# Patient Record
Sex: FEMALE | Race: WHITE | NOT HISPANIC OR LATINO
[De-identification: names, ages, dates, MRNs, and addresses within clinical notes are randomized per-mention and may not be internally consistent; named-entity substitution may affect disease eponyms.]

---

## 2017-03-27 ENCOUNTER — TRANSCRIPTION ENCOUNTER (OUTPATIENT)
Age: 77
End: 2017-03-27

## 2017-04-05 ENCOUNTER — APPOINTMENT (OUTPATIENT)
Dept: CARDIOLOGY | Facility: CLINIC | Age: 77
End: 2017-04-05

## 2017-06-19 ENCOUNTER — APPOINTMENT (OUTPATIENT)
Dept: CARDIOLOGY | Facility: CLINIC | Age: 77
End: 2017-06-19

## 2017-07-31 ENCOUNTER — TRANSCRIPTION ENCOUNTER (OUTPATIENT)
Age: 77
End: 2017-07-31

## 2017-12-18 ENCOUNTER — TRANSCRIPTION ENCOUNTER (OUTPATIENT)
Age: 77
End: 2017-12-18

## 2018-01-04 ENCOUNTER — OUTPATIENT (OUTPATIENT)
Dept: OUTPATIENT SERVICES | Facility: HOSPITAL | Age: 78
LOS: 1 days | End: 2018-01-04

## 2018-01-04 ENCOUNTER — INPATIENT (INPATIENT)
Facility: HOSPITAL | Age: 78
LOS: 3 days | Discharge: NURSING, REHAB OR LONG-TERM CARE | End: 2018-01-08
Payer: MEDICARE

## 2018-01-04 PROCEDURE — 72125 CT NECK SPINE W/O DYE: CPT | Mod: 26

## 2018-01-04 PROCEDURE — 73523 X-RAY EXAM HIPS BI 5/> VIEWS: CPT | Mod: 26

## 2018-01-04 PROCEDURE — 99223 1ST HOSP IP/OBS HIGH 75: CPT

## 2018-01-04 PROCEDURE — 71045 X-RAY EXAM CHEST 1 VIEW: CPT | Mod: 26

## 2018-01-04 PROCEDURE — 70450 CT HEAD/BRAIN W/O DYE: CPT | Mod: 26

## 2018-01-04 PROCEDURE — 99285 EMERGENCY DEPT VISIT HI MDM: CPT

## 2018-01-05 PROCEDURE — 93306 TTE W/DOPPLER COMPLETE: CPT | Mod: 26

## 2018-01-05 PROCEDURE — 99233 SBSQ HOSP IP/OBS HIGH 50: CPT

## 2018-01-05 PROCEDURE — 73503 X-RAY EXAM HIP UNI 4/> VIEWS: CPT | Mod: 26,LT

## 2018-01-06 PROCEDURE — 99232 SBSQ HOSP IP/OBS MODERATE 35: CPT

## 2018-01-09 ENCOUNTER — OUTPATIENT (OUTPATIENT)
Dept: OUTPATIENT SERVICES | Facility: HOSPITAL | Age: 78
LOS: 1 days | End: 2018-01-09

## 2018-01-17 ENCOUNTER — OUTPATIENT (OUTPATIENT)
Dept: OUTPATIENT SERVICES | Facility: HOSPITAL | Age: 78
LOS: 1 days | End: 2018-01-17

## 2018-01-31 ENCOUNTER — OUTPATIENT (OUTPATIENT)
Dept: OUTPATIENT SERVICES | Facility: HOSPITAL | Age: 78
LOS: 1 days | End: 2018-01-31

## 2018-11-28 ENCOUNTER — RECORD ABSTRACTING (OUTPATIENT)
Age: 78
End: 2018-11-28

## 2018-11-29 ENCOUNTER — RECORD ABSTRACTING (OUTPATIENT)
Age: 78
End: 2018-11-29

## 2018-11-29 DIAGNOSIS — Z87.19 PERSONAL HISTORY OF OTHER DISEASES OF THE DIGESTIVE SYSTEM: ICD-10-CM

## 2018-11-30 ENCOUNTER — RECORD ABSTRACTING (OUTPATIENT)
Age: 78
End: 2018-11-30

## 2018-12-03 ENCOUNTER — APPOINTMENT (OUTPATIENT)
Dept: CARDIOLOGY | Facility: CLINIC | Age: 78
End: 2018-12-03

## 2019-01-11 ENCOUNTER — RECORD ABSTRACTING (OUTPATIENT)
Age: 79
End: 2019-01-11

## 2019-01-14 ENCOUNTER — NON-APPOINTMENT (OUTPATIENT)
Age: 79
End: 2019-01-14

## 2019-01-14 ENCOUNTER — APPOINTMENT (OUTPATIENT)
Dept: CARDIOLOGY | Facility: CLINIC | Age: 79
End: 2019-01-14
Payer: MEDICARE

## 2019-01-14 ENCOUNTER — RECORD ABSTRACTING (OUTPATIENT)
Age: 79
End: 2019-01-14

## 2019-01-14 VITALS
DIASTOLIC BLOOD PRESSURE: 80 MMHG | OXYGEN SATURATION: 97 % | HEART RATE: 80 BPM | WEIGHT: 147 LBS | BODY MASS INDEX: 23.07 KG/M2 | SYSTOLIC BLOOD PRESSURE: 150 MMHG | HEIGHT: 67 IN

## 2019-01-14 DIAGNOSIS — J44.9 CHRONIC OBSTRUCTIVE PULMONARY DISEASE, UNSPECIFIED: ICD-10-CM

## 2019-01-14 DIAGNOSIS — I07.1 RHEUMATIC TRICUSPID INSUFFICIENCY: ICD-10-CM

## 2019-01-14 PROCEDURE — 93000 ELECTROCARDIOGRAM COMPLETE: CPT

## 2019-01-14 PROCEDURE — 99214 OFFICE O/P EST MOD 30 MIN: CPT

## 2019-01-14 RX ORDER — VALSARTAN AND HYDROCHLOROTHIAZIDE 80; 12.5 MG/1; MG/1
80-12.5 TABLET, FILM COATED ORAL DAILY
Refills: 0 | Status: DISCONTINUED | COMMUNITY
End: 2019-01-14

## 2019-01-14 NOTE — REASON FOR VISIT
[Follow-Up - Clinic] : a clinic follow-up of [Carotid Artery Stenosis] : carotid stenosis [Hyperlipidemia] : hyperlipidemia [Hypertension] : hypertension [Mitral Regurgitation] : mitral regurgitation [Peripheral Vascular Disease] : peripheral vascular disease [FreeTextEntry1] : 78-year-old comes in for followup consultation since last seen in 2017.\par Since last seen it appears she had a fall with fairly fracture. She had a hip replacement. She has been intermittently in the hospital. She has not had any significant cardiovascular hospital admission. She did have an echocardiogram in January 2018, which showed a hyperdynamic LV ejection fraction with moderate tricuspid regurgitation and moderate pulmonary hypertension.\par She had carotid Doppler study recently which showed nonobstructive bilateral disease.\par Her labs have shown normal renal function with normal percussion. There is leukocytosis, which is being managed with your office.\par She continued to have COPD related shortness of breath.\par She has no PND, orthopnea.\par She denies any significant claudication pain.\par She does have venous stasis changes in the lower extremity according to her with congestion. But no significant pain.\par She has no palpitations, dizziness, or syncopal episode.\par She denies any significant chest pain.\par She walks with a cane.\par

## 2019-01-14 NOTE — PHYSICAL EXAM
[General Appearance - Well Developed] : well developed [Normal Appearance] : normal appearance [Well Groomed] : well groomed [General Appearance - Well Nourished] : well nourished [No Deformities] : no deformities [General Appearance - In No Acute Distress] : no acute distress [No Oral Pallor] : no oral pallor [Normal Jugular Venous A Waves Present] : normal jugular venous A waves present [Normal Jugular Venous V Waves Present] : normal jugular venous V waves present [No Jugular Venous [place] A Waves] : no jugular venous langford A waves [Respiration, Rhythm And Depth] : normal respiratory rhythm and effort [Exaggerated Use Of Accessory Muscles For Inspiration] : no accessory muscle use [Heart Rate And Rhythm] : heart rate and rhythm were normal [Heart Sounds] : normal S1 and S2 [Edema] : no peripheral edema present [Abdomen Soft] : soft [Nail Clubbing] : no clubbing of the fingernails [Cyanosis, Localized] : no localized cyanosis [] : no rash [No Skin Ulcers] : no skin ulcer [Affect] : the affect was normal [Normal Conjunctiva] : the conjunctiva exhibited no abnormalities [FreeTextEntry1] : mariola zhang

## 2019-01-14 NOTE — HISTORY OF PRESENT ILLNESS
[FreeTextEntry1] : \par #1 COPD/emphysema. No recent acute exacerbation. Does get worse when she does not take her inhaler therapy. Being followed with pulmonologist. Stable exertional shortness of breath. Tries to go for exercise. No hemoptysis\par \par #2 essential hypertension. without any congestive heart failure, and renal insufficiency. Has been taking her medications regularly. She is nonsmoker.\par \par #3 dyslipidemia. Complain of myalgia, bilateral arm cramping with use of atorvastatin at 20 mg. According to her she did fine at 10 mg \par \par #4 bilateral diffuse peripheral arterial disease.  she was seen at [place]  [facility]. Recommended medical management.  Not on higher doses of statin due to being afraid of myalgia. Does walk with improved claudication distance. No change in color of the skin ulceration or weakness. Symptoms of moderate severity. Increases with activity but irregular walking improves the distance. Stable and nonprogressive. Tolerating low dose of aspirin without significant GI bleeding at present.\par \par #5 osteoarthritis. Significantly involving right hip bilateral knee. left knee replacement. left femur fracture.2016\par osteoporosis\par \par #6 mod nonrheumatic valvular regurgitation.  mod PHTN. \par \par #7 mild carotid atherosclerosis.\par

## 2019-01-14 NOTE — REVIEW OF SYSTEMS
[Dyspnea on exertion] : dyspnea during exertion [see HPI] : see HPI [Cough] : cough [Wheezing] : wheezing [Joint Pain] : joint pain [Joint Stiffness] : joint stiffness [Negative] : Endocrine [Shortness Of Breath] : no shortness of breath [Chest  Pressure] : no chest pressure [Chest Pain] : no chest pain [Lower Ext Edema] : no extremity edema [Leg Claudication] : no intermittent leg claudication [Palpitations] : no palpitations [Coughing Up Blood] : no hemoptysis [Muscle Cramps] : no muscle cramps [Limb Weakness (Paresis)] : no limb weakness

## 2019-01-14 NOTE — DISCUSSION/SUMMARY
[FreeTextEntry1] : #1 moderate tricuspid regurgitation. Moderate pulmonary hypertension. Clinically, no signs of right heart failure. Blood pressure well controlled. Echocardiogram will be done to followup on severity of regurgitation, ejection fraction, RV function, and pulmonary hypertension\par #2 peripheral arterial disease. No claudication pain. No significant resting ischemic symptoms. ABIs/PVR study for followup. Continue aspirin.\par #3 hyperlipidemia. Fasting lipid panel. In presence of carotid, as well as peripheral arterial disease with limited functional status if required. Consider maximizing statin therapy. In the past. She did have significant side effects. LDL goal would be less than 70.\par #4 essential hypertension. Very well controlled. Continue present regimen of medications.\par #5 COPD. Management as per your office.\par \par Counseling regarding low saturated fat, salt and carbohydrate intake was reviewed. Active lifestyle and regular. Exercise along with weight management is advised.\par All the above were at length reviewed. Answered all the questions. Thank you very much for this kind referral. Please do not hesitate to give me a call for any question.\par Part of this transcription was done with voice recognition software and phonetically similar errors are common. I apologize for that. Please donot hesitate to call for any questions due to above.\par \par Followup after above tests

## 2019-01-14 NOTE — ASSESSMENT
[FreeTextEntry1] : On January 14, 2018.\par Labs from December 12, 2018 reviewed.\par EKG January 14, 2019. Sinus bradycardia. Sinus arrhythmia. Indication essential hypertension. Interpretation by me.\par \par Prior tests for reference\par Echocardiogram January 5, 2018. Hyperdynamic LV ejection fraction. Moderate tricuspid consideration. Moderate pulmonary hypertension. Carotid Doppler study.\par  Ordered by PMD. Less than 50% atherosclerotic vascular disease.\par Nuclear myocardial perfusion scan. November 2015. Pharmacological. Non-ischemic MELVI/PVR study. Moderate right below knee arterial disease.\par \par \par \par \par

## 2019-02-06 ENCOUNTER — APPOINTMENT (OUTPATIENT)
Dept: CARDIOLOGY | Facility: CLINIC | Age: 79
End: 2019-02-06
Payer: MEDICARE

## 2019-02-06 VITALS
WEIGHT: 150 LBS | SYSTOLIC BLOOD PRESSURE: 190 MMHG | HEART RATE: 54 BPM | BODY MASS INDEX: 23.49 KG/M2 | DIASTOLIC BLOOD PRESSURE: 100 MMHG

## 2019-02-06 PROCEDURE — 99213 OFFICE O/P EST LOW 20 MIN: CPT

## 2019-02-06 PROCEDURE — 93306 TTE W/DOPPLER COMPLETE: CPT

## 2019-02-06 PROCEDURE — 93923 UPR/LXTR ART STDY 3+ LVLS: CPT

## 2019-02-06 NOTE — ASSESSMENT
[FreeTextEntry1] : TAGNELA BYRD is a 78 year old F who presents today Feb 06, 2019 with the above history and the following active issues: \par \par Asymptomatic markedly elevated high blood pressure: History of hypertension. No h/o CVA or recent hospitalizations for high blood pressure. She does report brief episode of blurry vision yesterday which resolved on its own. She missed her medications that day. Today she took losartan/hctz 100/12.5 and BP is 190/100 on exam. There are no focal neurologic complaints or deficits today. She was advised to take another losartan/hctz tablet today and then resume her normal regimen tomorrow. She will keep her appt with PCP tomorrow for close monitoring and will be seeing Dr. Aranda in 5 days to review testing and recheck BP. She was advised to seek emergent medical attention for any recurrent blurry vision, headache, dizziness, or syncope. \par \par Any questions and concerns were addressed and resolved. \par \par Sincerely,\par \par SAMARA Reinoso\par Patients history, testing, and plan reviewed with supervising MD: Dr. Wilberto Hartmann

## 2019-02-06 NOTE — PHYSICAL EXAM
[General Appearance - Well Developed] : well developed [Normal Appearance] : normal appearance [Well Groomed] : well groomed [General Appearance - Well Nourished] : well nourished [No Deformities] : no deformities [General Appearance - In No Acute Distress] : no acute distress [Normal Conjunctiva] : the conjunctiva exhibited no abnormalities [Eyelids - No Xanthelasma] : the eyelids demonstrated no xanthelasmas [No Oral Pallor] : no oral pallor [No Oral Cyanosis] : no oral cyanosis [Respiration, Rhythm And Depth] : normal respiratory rhythm and effort [Exaggerated Use Of Accessory Muscles For Inspiration] : no accessory muscle use [Auscultation Breath Sounds / Voice Sounds] : lungs were clear to auscultation bilaterally [Heart Rate And Rhythm] : heart rate and rhythm were normal [Heart Sounds] : normal S1 and S2 [Edema] : no peripheral edema present [Skin Color & Pigmentation] : normal skin color and pigmentation [] : no rash [Oriented To Time, Place, And Person] : oriented to person, place, and time [Affect] : the affect was normal [Mood] : the mood was normal [No Anxiety] : not feeling anxious [FreeTextEntry1] : abnormal baseline gait, walks with cane

## 2019-02-06 NOTE — HISTORY OF PRESENT ILLNESS
[FreeTextEntry1] : 77 y/o female presents today in clinical followup for elevated blood pressure. \par \par PMH of pelvic fracture and subsequent gait instability, moderate PH, TR, HTN, HLD, carotid atherosclerosis, and COPD. \par \par There is no history of cerebrovascular events and no hospitalizations for high blood pressure. \par \par She came in this morning for testing. Upon examination during her MELVI with PVR her pressure was noted 190/100. She has remained resting for about an hour an pressure is persistent at 190/100. She denies any focal neurologic complaints. Although yesterday she did note a short episode of blurry vision that resolved on its own.\par \par She was recently changed from diovan to losartan/hctz d/t a recall and missed a dose of her medications yesterday. She took losartan/hctz 100/12.5 this morning at approx 09:30. \par \par She denies chest pain, pressure, palpitations, unusual shortness of breath, orthopnea, LE edema, lightheadedness, dizziness, near syncope or syncope.

## 2019-02-08 ENCOUNTER — RECORD ABSTRACTING (OUTPATIENT)
Age: 79
End: 2019-02-08

## 2019-02-11 ENCOUNTER — APPOINTMENT (OUTPATIENT)
Dept: CARDIOLOGY | Facility: CLINIC | Age: 79
End: 2019-02-11
Payer: MEDICARE

## 2019-02-11 VITALS
WEIGHT: 148 LBS | BODY MASS INDEX: 23.23 KG/M2 | HEART RATE: 78 BPM | HEIGHT: 67 IN | OXYGEN SATURATION: 95 % | DIASTOLIC BLOOD PRESSURE: 90 MMHG | SYSTOLIC BLOOD PRESSURE: 150 MMHG

## 2019-02-11 PROCEDURE — 99214 OFFICE O/P EST MOD 30 MIN: CPT

## 2019-02-11 RX ORDER — ATORVASTATIN CALCIUM 20 MG/1
20 TABLET, FILM COATED ORAL DAILY
Qty: 90 | Refills: 3 | Status: ACTIVE | COMMUNITY
Start: 1900-01-01 | End: 1900-01-01

## 2019-02-11 NOTE — REASON FOR VISIT
[Follow-Up - Clinic] : a clinic follow-up of [Hypertension] : hypertension [Medication Management] : Medication management [Peripheral Vascular Disease] : peripheral vascular disease

## 2019-02-11 NOTE — HISTORY OF PRESENT ILLNESS
[FreeTextEntry1] : \par #1 COPD/emphysema. No recent acute exacerbation. Does get worse when she does not take her inhaler therapy. Being followed with pulmonologist. Stable exertional shortness of breath. Tries to go for exercise. No hemoptysis\par \par #2 essential hypertension. without any congestive heart failure, and renal insufficiency. Has been taking her medications regularly. She is nonsmoker.\par \par #3 dyslipidemia. Complain of myalgia, bilateral arm cramping with use of atorvastatin at 20 mg. According to her she did fine at 10 mg \par \par #4 bilateral diffuse peripheral arterial disease.  she was seen at [facility]  [facility]. Recommended medical management.  Not on higher doses of statin due to being afraid of myalgia. Does walk with improved claudication distance. No change in color of the skin ulceration or weakness. Symptoms of moderate severity. Increases with activity but irregular walking improves the distance. Stable and nonprogressive. Tolerating low dose of aspirin without significant GI bleeding at present.\par \par #5 osteoarthritis. Significantly involving right hip bilateral knee. left knee replacement. left femur fracture.2016\par osteoporosis\par \par #6 mod nonrheumatic valvular regurgitation.  mod PHTN. \par \par #7 mild carotid atherosclerosis.\par

## 2019-02-11 NOTE — ASSESSMENT
[FreeTextEntry1] : On January 14, 2018.\par Labs from December 12, 2018 reviewed.\par EKG January 14, 2019. Sinus bradycardia. Sinus arrhythmia. Indication essential hypertension. Interpretation by me.\par \par Prior tests for reference\par Echocardiogram January 5, 2018. Hyperdynamic LV ejection fraction. Moderate tricuspid consideration. Moderate pulmonary hypertension. Carotid Doppler study.\par  Ordered by PMD. Less than 50% atherosclerotic vascular disease.\par Nuclear myocardial perfusion scan. November 2015. Pharmacological. Non-ischemic MELVI/PVR study. Moderate right below knee arterial disease.\par \par Reviewed on February 11, 2019 \par Echocardiogram. February 6, 2019 P. LV ejection fraction 65%. Mild mitral regurgitation. Systolic anterior motion of mitral leaflet. Mild aortic stenosis. Mild pulmonary hypertension.\par MELVI/PVR study. Significant distal disease, right more than left.\par Labs from January 29, 2018 were to be\par \par \par

## 2019-02-11 NOTE — DISCUSSION/SUMMARY
[FreeTextEntry1] : #1 essential hypertension. Amlodipine 5 mg added. Continual rest of the medications. Better control blood pressure is needed. Low salt diet.\par #2 peripheral artery disease. A vascular evaluation recommended. Diffuse disease. In the past medical management was advised. Increasing walking recommended. Continue aspirin.\par #3 hyperlipidemia. Increase statin therapy. As tolerated.\par Still 40 mg or 80 mg dosage.\par #4 mild to moderate non-rheumatic valvular abnormality.  mild pulmonary hypertension. Continue to follow.\par #5 COPD. Stable. Continue to follow.\par \par Counseling regarding low saturated fat, salt and carbohydrate intake was reviewed. Active lifestyle and regular. Exercise along with weight management is advised.\par All the above were at length reviewed. Answered all the questions. Thank you very much for this kind referral. Please do not hesitate to give me a call for any question.\par Part of this transcription was done with voice recognition software and phonetically similar errors are common. I apologize for that. Please donot hesitate to call for any questions due to above.\par \par

## 2019-02-11 NOTE — PHYSICAL EXAM
[General Appearance - Well Developed] : well developed [Normal Appearance] : normal appearance [Well Groomed] : well groomed [General Appearance - Well Nourished] : well nourished [No Deformities] : no deformities [General Appearance - In No Acute Distress] : no acute distress [Normal Conjunctiva] : the conjunctiva exhibited no abnormalities [Eyelids - No Xanthelasma] : the eyelids demonstrated no xanthelasmas [No Oral Pallor] : no oral pallor [No Oral Cyanosis] : no oral cyanosis [Respiration, Rhythm And Depth] : normal respiratory rhythm and effort [Exaggerated Use Of Accessory Muscles For Inspiration] : no accessory muscle use [Auscultation Breath Sounds / Voice Sounds] : lungs were clear to auscultation bilaterally [Heart Rate And Rhythm] : heart rate and rhythm were normal [Heart Sounds] : normal S1 and S2 [Edema] : no peripheral edema present [FreeTextEntry1] : abnormal baseline gait, walks with cane [Skin Color & Pigmentation] : normal skin color and pigmentation [] : no rash [Oriented To Time, Place, And Person] : oriented to person, place, and time [Affect] : the affect was normal [Mood] : the mood was normal [No Anxiety] : not feeling anxious

## 2019-03-20 ENCOUNTER — APPOINTMENT (OUTPATIENT)
Dept: CARDIOLOGY | Facility: CLINIC | Age: 79
End: 2019-03-20
Payer: MEDICARE

## 2019-03-20 VITALS
DIASTOLIC BLOOD PRESSURE: 80 MMHG | BODY MASS INDEX: 23.54 KG/M2 | WEIGHT: 150 LBS | HEART RATE: 73 BPM | SYSTOLIC BLOOD PRESSURE: 142 MMHG | HEIGHT: 67 IN

## 2019-03-20 VITALS — DIASTOLIC BLOOD PRESSURE: 80 MMHG | SYSTOLIC BLOOD PRESSURE: 150 MMHG

## 2019-03-20 DIAGNOSIS — R68.89 OTHER GENERAL SYMPTOMS AND SIGNS: ICD-10-CM

## 2019-03-20 PROCEDURE — 99215 OFFICE O/P EST HI 40 MIN: CPT

## 2019-03-20 NOTE — HISTORY OF PRESENT ILLNESS
[FreeTextEntry1] : 77 y/o with PMH of COPD/emphysema , HTN , dyslipidemia who is referred because of history of peripheral vascular disease on medical management . She has multiple left leg surgery including knee replacement , hip fracture surgery over the past year . She currently walks with a cane and is able to walk in a grocery store without any perceived claudication. MELVI done recently shows Right MELVI 0.47 and left MELVI 0.81. She denies any LE numbness , tingling, ulcer or skin changes.\par In the past she had claudication in the right LE on walking 2 blocks but these symptoms doesn't not occur now. \par

## 2019-03-20 NOTE — ASSESSMENT
[FreeTextEntry1] : Peripheral artery disease moderate to severe in right LE ( Right MELVI 0.47) , there is however discrepancy in severity of ischemia on MELVI and symptoms. \par will obtain U/S bilateral LE to evaluate anatomically , likely moderate to severe outflow disease ( SFA disease) \par Medical therapy for now including ASA 81 mg and statins (LDL goal < 70 mg) , can add cilostazol 50 mg twice daily. \par \par Mild carotid atherosclerosis on ASA 81 mg and statins. no TIA/CVA, follow every 2-3 years.

## 2019-03-20 NOTE — REASON FOR VISIT
[Initial Evaluation] : an initial evaluation of [Peripheral Vascular Disease] : peripheral vascular disease

## 2019-03-20 NOTE — PHYSICAL EXAM
[General Appearance - Well Developed] : well developed [Normal Appearance] : normal appearance [Well Groomed] : well groomed [General Appearance - Well Nourished] : well nourished [Normal Conjunctiva] : the conjunctiva exhibited no abnormalities [Eyelids - No Xanthelasma] : the eyelids demonstrated no xanthelasmas [Normal Oral Mucosa] : normal oral mucosa [No Oral Pallor] : no oral pallor [No Oral Cyanosis] : no oral cyanosis [Normal Jugular Venous A Waves Present] : normal jugular venous A waves present [Normal Jugular Venous V Waves Present] : normal jugular venous V waves present [Heart Sounds] : normal S1 and S2 [Edema] : no peripheral edema present [Auscultation Breath Sounds / Voice Sounds] : lungs were clear to auscultation bilaterally [Abdomen Soft] : soft [Abdomen Tenderness] : non-tender [Abdomen Mass (___ Cm)] : no abdominal mass palpated [Abnormal Walk] : normal gait [Nail Clubbing] : no clubbing of the fingernails [Cyanosis, Localized] : no localized cyanosis [] : no ischemic changes [Skin Turgor] : normal skin turgor [No Venous Stasis] : no venous stasis [Oriented To Time, Place, And Person] : oriented to person, place, and time [Affect] : the affect was normal [Mood] : the mood was normal [FreeTextEntry1] : bilateral feet varicose veins.

## 2019-04-15 ENCOUNTER — APPOINTMENT (OUTPATIENT)
Dept: CARDIOLOGY | Facility: CLINIC | Age: 79
End: 2019-04-15

## 2019-06-11 RX ORDER — MENTHOL/CAMPHOR 0.5 %-0.5%
1000 LOTION (ML) TOPICAL
Refills: 0 | Status: ACTIVE | COMMUNITY

## 2019-06-11 RX ORDER — IPRATROPIUM BROMIDE 0.5 MG/2.5ML
0.02 SOLUTION RESPIRATORY (INHALATION) 3 TIMES DAILY
Refills: 0 | Status: ACTIVE | COMMUNITY

## 2019-06-11 RX ORDER — CHLORHEXIDINE GLUCONATE 4 %
5 LIQUID (ML) TOPICAL
Refills: 0 | Status: ACTIVE | COMMUNITY

## 2019-06-11 RX ORDER — MESALAMINE 1.2 G/1
1.2 TABLET, DELAYED RELEASE ORAL
Refills: 0 | Status: ACTIVE | COMMUNITY

## 2019-06-11 RX ORDER — METHYLDOPA/HYDROCHLOROTHIAZIDE 250MG-15MG
TABLET ORAL
Refills: 0 | Status: COMPLETED | COMMUNITY
End: 2019-06-11

## 2019-06-11 RX ORDER — ARFORMOTEROL TARTRATE 15 UG/2ML
15 SOLUTION RESPIRATORY (INHALATION) TWICE DAILY
Refills: 0 | Status: ACTIVE | COMMUNITY

## 2019-06-11 RX ORDER — BUDESONIDE 0.25 MG/2ML
0.25 SUSPENSION RESPIRATORY (INHALATION) TWICE DAILY
Refills: 0 | Status: ACTIVE | COMMUNITY

## 2019-06-11 RX ORDER — ASPIRIN 81 MG
81 TABLET, DELAYED RELEASE (ENTERIC COATED) ORAL DAILY
Refills: 0 | Status: ACTIVE | COMMUNITY

## 2019-08-21 ENCOUNTER — APPOINTMENT (OUTPATIENT)
Dept: CARDIOLOGY | Facility: CLINIC | Age: 79
End: 2019-08-21

## 2020-02-20 ENCOUNTER — RX RENEWAL (OUTPATIENT)
Age: 80
End: 2020-02-20

## 2020-02-24 RX ORDER — LOSARTAN POTASSIUM 100 MG/1
100 TABLET, FILM COATED ORAL DAILY
Qty: 90 | Refills: 3 | Status: ACTIVE | COMMUNITY
Start: 2020-02-24 | End: 1900-01-01

## 2020-02-24 RX ORDER — LOSARTAN POTASSIUM AND HYDROCHLOROTHIAZIDE 12.5; 1 MG/1; MG/1
100-12.5 TABLET ORAL DAILY
Qty: 30 | Refills: 0 | Status: DISCONTINUED | COMMUNITY
Start: 2019-01-14 | End: 2020-02-24

## 2020-02-28 ENCOUNTER — APPOINTMENT (OUTPATIENT)
Dept: CARDIOLOGY | Facility: CLINIC | Age: 80
End: 2020-02-28
Payer: MEDICARE

## 2020-02-28 ENCOUNTER — NON-APPOINTMENT (OUTPATIENT)
Age: 80
End: 2020-02-28

## 2020-02-28 VITALS
DIASTOLIC BLOOD PRESSURE: 70 MMHG | SYSTOLIC BLOOD PRESSURE: 138 MMHG | WEIGHT: 144 LBS | BODY MASS INDEX: 22.6 KG/M2 | OXYGEN SATURATION: 99 % | HEIGHT: 67 IN | HEART RATE: 66 BPM

## 2020-02-28 DIAGNOSIS — E78.5 HYPERLIPIDEMIA, UNSPECIFIED: ICD-10-CM

## 2020-02-28 DIAGNOSIS — I35.2 NONRHEUMATIC AORTIC (VALVE) STENOSIS WITH INSUFFICIENCY: ICD-10-CM

## 2020-02-28 DIAGNOSIS — I27.20 PULMONARY HYPERTENSION, UNSPECIFIED: ICD-10-CM

## 2020-02-28 DIAGNOSIS — Z87.891 PERSONAL HISTORY OF NICOTINE DEPENDENCE: ICD-10-CM

## 2020-02-28 DIAGNOSIS — I10 ESSENTIAL (PRIMARY) HYPERTENSION: ICD-10-CM

## 2020-02-28 DIAGNOSIS — I73.9 PERIPHERAL VASCULAR DISEASE, UNSPECIFIED: ICD-10-CM

## 2020-02-28 DIAGNOSIS — I65.23 OCCLUSION AND STENOSIS OF BILATERAL CAROTID ARTERIES: ICD-10-CM

## 2020-02-28 PROCEDURE — 93306 TTE W/DOPPLER COMPLETE: CPT

## 2020-02-28 PROCEDURE — 93880 EXTRACRANIAL BILAT STUDY: CPT

## 2020-02-28 PROCEDURE — 99215 OFFICE O/P EST HI 40 MIN: CPT

## 2020-02-28 PROCEDURE — 93000 ELECTROCARDIOGRAM COMPLETE: CPT

## 2020-02-28 NOTE — DISCUSSION/SUMMARY
[FreeTextEntry1] : #1 essential hypertension.  Much better control.  Continue amlodipine 5 mg losartan 100 mg hydrochlorthiazide 12.5 mg.  Labs ordered.  Goal less than 130/80.  Low-salt diet.  Increasing walking exercise.\par #2 peripheral artery disease.  Vascular ultrasound is recommended.  Though increasing walking along with continued aspirin and statin therapy are essential in overall improving her long-term cardiovascular and peripheral arterial disease related symptoms and associated morbidity mortality.  If needed for more symptoms when she starts walking cilostazol can be added.\par #3 hyperlipidemia.  Tolerating 20 mg well at present.  Recommended fasting lipid panel.  Based on that further discussion regarding adjustment of the medications.  Low saturated fat carbohydrate intake recommended.\par #4 mild to moderate non-rheumatic valvular abnormality.  mild pulmonary hypertension.  Echocardiogram will be done to evaluate her aortic and mitral valvular disease.  Pulmonary hypertension.  LV and RV function.\par #5 COPD. Stable. Continue to follow with pulmonologist\par 6.  Carotid atherosclerosis.  Mild to moderate.  Carotid bruit.  Carotid Doppler study is recommended to evaluate for any significant worsening stenosis.  She will continue with her aspirin and statin therapy.\par #7 stress/anxiety.  Stress management/behavioral modification with your office.  Relationship with cardiovascular disease discussed.\par \par Counseling regarding low saturated fat, salt and carbohydrate intake was reviewed. Active lifestyle and regular. Exercise along with weight management is advised.\par All the above were at length reviewed. Answered all the questions. Thank you very much for this kind referral. Please do not hesitate to give me a call for any question.\par Part of this transcription was done with voice recognition software and phonetically similar errors are common. I apologize for that. Please donot hesitate to call for any questions due to above.\par \par If above tests are stable follow-up in 4 to 6 months.\par \par Addendum to above\par Echocardiogram done on February 28, 2020 was reviewed and interpreted by me.  No significant new changes compared to before.  Preserved LV systolic function.\par Carotid Doppler study February 28, 2020.  Mild nonobstructive carotid atherosclerosis.\par Reviewed with her prior to her leaving the office.\par Follow-up as planned above.

## 2020-02-28 NOTE — HISTORY OF PRESENT ILLNESS
[FreeTextEntry1] : HPI\par #1 COPD/emphysema. No recent acute exacerbation. Does get worse when she does not take her inhaler therapy. Being followed with pulmonologist. \par #2 essential hypertension. without any congestive heart failure, and renal insufficiency. Has been taking her medications regularly.  She is a former smoker.  Quit about 10 years ago.\par #3 dyslipidemia.  She has been tolerating her atorvastatin at 20 mg well at present.\par #4 bilateral diffuse peripheral arterial disease.  she was seen at [place]  [facility]. Recommended medical management.  Not on higher doses of statin due to being afraid of myalgia.  Unable to evaluate claudication distance as she has not been walking regularly. No change in color of the skin ulceration or weakness.  Tolerating low dose of aspirin without significant GI bleeding at present.  She was recommended to have vascular ultrasound which she has not done.\par #5 osteoarthritis. Significantly involving right hip bilateral knee. left knee replacement. left femur fracture.2016\par osteoporosis\par #6 mild to moderate nonrheumatic valvular regurgitation/stenosis.  mod PHTN. \par #7 carotid atherosclerosis.\par

## 2020-02-28 NOTE — ASSESSMENT
[FreeTextEntry1] : On January 14, 2018.\par Labs from December 12, 2018 reviewed.\par EKG January 14, 2019. Sinus bradycardia. Sinus arrhythmia. Indication essential hypertension. Interpretation by me.\par \par Prior tests for reference\par Echocardiogram January 5, 2018. Hyperdynamic LV ejection fraction. Moderate tricuspid consideration. Moderate pulmonary hypertension. Carotid Doppler study.\par  Ordered by PMD. Less than 50% atherosclerotic vascular disease.\par Nuclear myocardial perfusion scan. November 2015. Pharmacological. Non-ischemic MELVI/PVR study. Moderate right below knee arterial disease.\par \par Reviewed on February 11, 2019 \par Echocardiogram. February 6, 2019 P. LV ejection fraction 65%. Mild mitral regurgitation. Systolic anterior motion of mitral leaflet. Mild aortic stenosis. Mild pulmonary hypertension.\par MELVI/PVR study. Significant distal disease, right more than left.\par \par Reviewed on February 28, 2020\par EKG February 28, 2020 per indication hypertension.  Dyspnea.  Interpretation.  Normal sinus rhythm.\par Labs are not available since 2019 February.\par \par \par

## 2020-02-28 NOTE — REASON FOR VISIT
[Follow-Up - Clinic] : a clinic follow-up of [Hypertension] : hypertension [Medication Management] : Medication management [Peripheral Vascular Disease] : peripheral vascular disease [Dyspnea] : dyspnea [FreeTextEntry1] : 79-year-old female comes in for follow-up consultation.  She did not follow through on multiple recommendations last year in relation to evaluation of her peripheral arterial disease with significantly abnormal MELVI.  Her activity level at present is very limited because of back pain and osteoarthritis.\par She has continued dyspnea on exertion moderate severity progressive worsening associated with dry cough.  No hemoptysis.  There is no PND orthopnea pedal edema.  There is no chest pain.  It is better on her inhaler therapy.  Recently seen by pulmonologist and according to him stable COPD.\par She has no significant palpitation dizziness or syncopal event though her activity level is significantly curtailed over last 1 year.\par She has no recent hospital admission\par Her weight diet appetite is otherwise stable.\par She states compliance with her medications.

## 2020-02-28 NOTE — PHYSICAL EXAM
[General Appearance - Well Developed] : well developed [Normal Appearance] : normal appearance [Well Groomed] : well groomed [General Appearance - Well Nourished] : well nourished [No Deformities] : no deformities [General Appearance - In No Acute Distress] : no acute distress [Normal Conjunctiva] : the conjunctiva exhibited no abnormalities [Eyelids - No Xanthelasma] : the eyelids demonstrated no xanthelasmas [No Oral Pallor] : no oral pallor [No Oral Cyanosis] : no oral cyanosis [Respiration, Rhythm And Depth] : normal respiratory rhythm and effort [Exaggerated Use Of Accessory Muscles For Inspiration] : no accessory muscle use [Auscultation Breath Sounds / Voice Sounds] : lungs were clear to auscultation bilaterally [Heart Rate And Rhythm] : heart rate and rhythm were normal [Heart Sounds] : normal S1 and S2 [Edema] : no peripheral edema present [Skin Color & Pigmentation] : normal skin color and pigmentation [Oriented To Time, Place, And Person] : oriented to person, place, and time [Affect] : the affect was normal [Mood] : the mood was normal [No Anxiety] : not feeling anxious [Veins - Varicosity Changes] : no varicosital changes were noted in the lower extremities [Abdomen Soft] : soft [Nail Clubbing] : no clubbing of the fingernails [Nail Splinter Hemorrhages] : no splinter hemorrhages of the nails [] : no ischemic changes [Cyanosis, Localized] : no localized cyanosis [No Xanthoma] : no  xanthoma was observed [No Venous Stasis] : no venous stasis [FreeTextEntry1] : abnormal baseline gait, walks with cane

## 2020-02-28 NOTE — REVIEW OF SYSTEMS
[Dyspnea on exertion] : dyspnea during exertion [Shortness Of Breath] : shortness of breath [Leg Claudication] : intermittent leg claudication [Wheezing] : wheezing [Cough] : cough [see HPI] : see HPI [Joint Pain] : joint pain [Joint Stiffness] : joint stiffness [Negative] : Heme/Lymph [Chest Pain] : no chest pain [Chest  Pressure] : no chest pressure [Palpitations] : no palpitations [Coughing Up Blood] : no hemoptysis [Lower Ext Edema] : no extremity edema [Joint Swelling] : no joint swelling [Muscle Cramps] : no muscle cramps [Limb Weakness (Paresis)] : no limb weakness

## 2020-04-02 ENCOUNTER — INPATIENT (INPATIENT)
Facility: HOSPITAL | Age: 80
LOS: 2 days | Discharge: HOME | DRG: 446 | End: 2020-04-05
Attending: SURGERY | Admitting: SURGERY
Payer: MEDICARE

## 2020-04-02 ENCOUNTER — EMERGENCY (EMERGENCY)
Facility: HOSPITAL | Age: 80
LOS: 1 days | End: 2020-04-02
Admitting: EMERGENCY MEDICINE
Payer: MEDICARE

## 2020-04-02 VITALS
OXYGEN SATURATION: 97 % | HEART RATE: 97 BPM | RESPIRATION RATE: 18 BRPM | DIASTOLIC BLOOD PRESSURE: 40 MMHG | WEIGHT: 149.91 LBS | SYSTOLIC BLOOD PRESSURE: 110 MMHG | TEMPERATURE: 98 F | HEIGHT: 65 IN

## 2020-04-02 PROCEDURE — 71250 CT THORAX DX C-: CPT | Mod: 26

## 2020-04-02 PROCEDURE — 74176 CT ABD & PELVIS W/O CONTRAST: CPT | Mod: 26

## 2020-04-02 PROCEDURE — 76705 ECHO EXAM OF ABDOMEN: CPT | Mod: 26

## 2020-04-02 PROCEDURE — 99285 EMERGENCY DEPT VISIT HI MDM: CPT

## 2020-04-02 PROCEDURE — 99291 CRITICAL CARE FIRST HOUR: CPT

## 2020-04-02 PROCEDURE — 71045 X-RAY EXAM CHEST 1 VIEW: CPT | Mod: 26

## 2020-04-02 NOTE — ED ADULT TRIAGE NOTE - CHIEF COMPLAINT QUOTE
transfer from [place] for acute cholangitis and need for IR placement of chase. drain. Pending Covid 19 results.

## 2020-04-02 NOTE — ED ADULT NURSE REASSESSMENT NOTE - NS ED NURSE REASSESS COMMENT FT1
pt presents to the ed transfer from [facility] for acute choley. pt to be seen in ir for drain placement. pt swabed at [facility] for covid awaiting results. vs stable, no s/s of acute distress. pending lab results, seen and eval by md otoole

## 2020-04-03 DIAGNOSIS — K81.0 ACUTE CHOLECYSTITIS: ICD-10-CM

## 2020-04-03 DIAGNOSIS — R94.5 ABNORMAL RESULTS OF LIVER FUNCTION STUDIES: ICD-10-CM

## 2020-04-03 LAB
ABO RH CONFIRMATION: SIGNIFICANT CHANGE UP
ALBUMIN SERPL ELPH-MCNC: 2.6 G/DL — LOW (ref 3.3–5.2)
ALBUMIN SERPL ELPH-MCNC: 2.8 G/DL — LOW (ref 3.3–5.2)
ALP SERPL-CCNC: 107 U/L — SIGNIFICANT CHANGE UP (ref 40–120)
ALP SERPL-CCNC: 118 U/L — SIGNIFICANT CHANGE UP (ref 40–120)
ALT FLD-CCNC: 39 U/L — HIGH
ALT FLD-CCNC: 47 U/L — HIGH
ANION GAP SERPL CALC-SCNC: 16 MMOL/L — SIGNIFICANT CHANGE UP (ref 5–17)
ANION GAP SERPL CALC-SCNC: 16 MMOL/L — SIGNIFICANT CHANGE UP (ref 5–17)
APTT BLD: 27.6 SEC — SIGNIFICANT CHANGE UP (ref 27.5–36.3)
AST SERPL-CCNC: 45 U/L — HIGH
AST SERPL-CCNC: 66 U/L — HIGH
BASE EXCESS BLDV CALC-SCNC: -5.9 MMOL/L — LOW (ref -2–2)
BASOPHILS # BLD AUTO: 0 K/UL — SIGNIFICANT CHANGE UP (ref 0–0.2)
BASOPHILS # BLD AUTO: 0.06 K/UL — SIGNIFICANT CHANGE UP (ref 0–0.2)
BASOPHILS NFR BLD AUTO: 0 % — SIGNIFICANT CHANGE UP (ref 0–2)
BASOPHILS NFR BLD AUTO: 0.2 % — SIGNIFICANT CHANGE UP (ref 0–2)
BILIRUB DIRECT SERPL-MCNC: 0.4 MG/DL — HIGH (ref 0–0.3)
BILIRUB INDIRECT FLD-MCNC: 0.5 MG/DL — SIGNIFICANT CHANGE UP (ref 0.2–1)
BILIRUB SERPL-MCNC: 0.9 MG/DL — SIGNIFICANT CHANGE UP (ref 0.4–2)
BILIRUB SERPL-MCNC: 1.1 MG/DL — SIGNIFICANT CHANGE UP (ref 0.4–2)
BLD GP AB SCN SERPL QL: SIGNIFICANT CHANGE UP
BUN SERPL-MCNC: 28 MG/DL — HIGH (ref 8–20)
BUN SERPL-MCNC: 29 MG/DL — HIGH (ref 8–20)
CA-I SERPL-SCNC: 0.96 MMOL/L — LOW (ref 1.15–1.33)
CALCIUM SERPL-MCNC: 7.7 MG/DL — LOW (ref 8.6–10.2)
CALCIUM SERPL-MCNC: 7.9 MG/DL — LOW (ref 8.6–10.2)
CHLORIDE BLDV-SCNC: 103 MMOL/L — SIGNIFICANT CHANGE UP (ref 98–107)
CHLORIDE SERPL-SCNC: 100 MMOL/L — SIGNIFICANT CHANGE UP (ref 98–107)
CHLORIDE SERPL-SCNC: 103 MMOL/L — SIGNIFICANT CHANGE UP (ref 98–107)
CO2 SERPL-SCNC: 19 MMOL/L — LOW (ref 22–29)
CO2 SERPL-SCNC: 19 MMOL/L — LOW (ref 22–29)
CREAT SERPL-MCNC: 1.59 MG/DL — HIGH (ref 0.5–1.3)
CREAT SERPL-MCNC: 1.85 MG/DL — HIGH (ref 0.5–1.3)
EOSINOPHIL # BLD AUTO: 0 K/UL — SIGNIFICANT CHANGE UP (ref 0–0.5)
EOSINOPHIL # BLD AUTO: 0.01 K/UL — SIGNIFICANT CHANGE UP (ref 0–0.5)
EOSINOPHIL NFR BLD AUTO: 0 % — SIGNIFICANT CHANGE UP (ref 0–6)
EOSINOPHIL NFR BLD AUTO: 0 % — SIGNIFICANT CHANGE UP (ref 0–6)
GAS PNL BLDV: 136 MMOL/L — SIGNIFICANT CHANGE UP (ref 135–145)
GAS PNL BLDV: SIGNIFICANT CHANGE UP
GAS PNL BLDV: SIGNIFICANT CHANGE UP
GIANT PLATELETS BLD QL SMEAR: PRESENT — SIGNIFICANT CHANGE UP
GLUCOSE BLDV-MCNC: 107 MG/DL — HIGH (ref 70–99)
GLUCOSE SERPL-MCNC: 110 MG/DL — HIGH (ref 70–99)
GLUCOSE SERPL-MCNC: 76 MG/DL — SIGNIFICANT CHANGE UP (ref 70–99)
GRAM STN FLD: SIGNIFICANT CHANGE UP
HCO3 BLDV-SCNC: 19 MMOL/L — LOW (ref 20–26)
HCT VFR BLD CALC: 35.7 % — SIGNIFICANT CHANGE UP (ref 34.5–45)
HCT VFR BLD CALC: 36.5 % — SIGNIFICANT CHANGE UP (ref 34.5–45)
HCT VFR BLDA CALC: 37 — LOW (ref 39–50)
HGB BLD CALC-MCNC: 12.2 G/DL — SIGNIFICANT CHANGE UP (ref 11.5–15.5)
HGB BLD-MCNC: 11.6 G/DL — SIGNIFICANT CHANGE UP (ref 11.5–15.5)
HGB BLD-MCNC: 12.1 G/DL — SIGNIFICANT CHANGE UP (ref 11.5–15.5)
IMM GRANULOCYTES NFR BLD AUTO: 1.4 % — SIGNIFICANT CHANGE UP (ref 0–1.5)
INR BLD: 1.2 RATIO — HIGH (ref 0.88–1.16)
LACTATE BLDV-MCNC: 2.5 MMOL/L — HIGH (ref 0.5–2)
LIDOCAIN IGE QN: 15 U/L — LOW (ref 22–51)
LYMPHOCYTES # BLD AUTO: 0.79 K/UL — LOW (ref 1–3.3)
LYMPHOCYTES # BLD AUTO: 1.14 K/UL — SIGNIFICANT CHANGE UP (ref 1–3.3)
LYMPHOCYTES # BLD AUTO: 2.6 % — LOW (ref 13–44)
LYMPHOCYTES # BLD AUTO: 4 % — LOW (ref 13–44)
MAGNESIUM SERPL-MCNC: 1.7 MG/DL — SIGNIFICANT CHANGE UP (ref 1.6–2.6)
MANUAL SMEAR VERIFICATION: SIGNIFICANT CHANGE UP
MCHC RBC-ENTMCNC: 28.8 PG — SIGNIFICANT CHANGE UP (ref 27–34)
MCHC RBC-ENTMCNC: 29.4 PG — SIGNIFICANT CHANGE UP (ref 27–34)
MCHC RBC-ENTMCNC: 32.5 GM/DL — SIGNIFICANT CHANGE UP (ref 32–36)
MCHC RBC-ENTMCNC: 33.2 GM/DL — SIGNIFICANT CHANGE UP (ref 32–36)
MCV RBC AUTO: 88.6 FL — SIGNIFICANT CHANGE UP (ref 80–100)
MCV RBC AUTO: 88.8 FL — SIGNIFICANT CHANGE UP (ref 80–100)
METAMYELOCYTES # FLD: 3.5 % — HIGH (ref 0–0)
MONOCYTES # BLD AUTO: 1.07 K/UL — HIGH (ref 0–0.9)
MONOCYTES # BLD AUTO: 1.82 K/UL — HIGH (ref 0–0.9)
MONOCYTES NFR BLD AUTO: 3.7 % — SIGNIFICANT CHANGE UP (ref 2–14)
MONOCYTES NFR BLD AUTO: 6 % — SIGNIFICANT CHANGE UP (ref 2–14)
MYELOCYTES NFR BLD: 5.2 % — HIGH (ref 0–0)
NEUTROPHILS # BLD AUTO: 25.13 K/UL — HIGH (ref 1.8–7.4)
NEUTROPHILS # BLD AUTO: 26.14 K/UL — HIGH (ref 1.8–7.4)
NEUTROPHILS NFR BLD AUTO: 78.4 % — HIGH (ref 43–77)
NEUTROPHILS NFR BLD AUTO: 90.7 % — HIGH (ref 43–77)
NEUTS BAND # BLD: 4.3 % — SIGNIFICANT CHANGE UP (ref 0–8)
OTHER CELLS CSF MANUAL: 14 ML/DL — LOW (ref 18–22)
OVALOCYTES BLD QL SMEAR: SLIGHT — SIGNIFICANT CHANGE UP
PCO2 BLDV: 45 MMHG — SIGNIFICANT CHANGE UP (ref 35–50)
PH BLDV: 7.27 — LOW (ref 7.32–7.43)
PHOSPHATE SERPL-MCNC: 3.5 MG/DL — SIGNIFICANT CHANGE UP (ref 2.4–4.7)
PLAT MORPH BLD: NORMAL — SIGNIFICANT CHANGE UP
PLATELET # BLD AUTO: 151 K/UL — SIGNIFICANT CHANGE UP (ref 150–400)
PLATELET # BLD AUTO: 188 K/UL — SIGNIFICANT CHANGE UP (ref 150–400)
PO2 BLDV: 58 MMHG — HIGH (ref 25–45)
POIKILOCYTOSIS BLD QL AUTO: SLIGHT — SIGNIFICANT CHANGE UP
POTASSIUM BLDV-SCNC: 3.1 MMOL/L — LOW (ref 3.4–4.5)
POTASSIUM SERPL-MCNC: 2.9 MMOL/L — CRITICAL LOW (ref 3.5–5.3)
POTASSIUM SERPL-MCNC: 3.2 MMOL/L — LOW (ref 3.5–5.3)
POTASSIUM SERPL-SCNC: 2.9 MMOL/L — CRITICAL LOW (ref 3.5–5.3)
POTASSIUM SERPL-SCNC: 3.2 MMOL/L — LOW (ref 3.5–5.3)
PROT SERPL-MCNC: 5.5 G/DL — LOW (ref 6.6–8.7)
PROT SERPL-MCNC: 5.6 G/DL — LOW (ref 6.6–8.7)
PROTHROM AB SERPL-ACNC: 13.6 SEC — HIGH (ref 10–12.9)
RBC # BLD: 4.03 M/UL — SIGNIFICANT CHANGE UP (ref 3.8–5.2)
RBC # BLD: 4.11 M/UL — SIGNIFICANT CHANGE UP (ref 3.8–5.2)
RBC # FLD: 13.1 % — SIGNIFICANT CHANGE UP (ref 10.3–14.5)
RBC # FLD: 13.1 % — SIGNIFICANT CHANGE UP (ref 10.3–14.5)
RBC BLD AUTO: ABNORMAL
SAO2 % BLDV: 87 % — SIGNIFICANT CHANGE UP
SARS-COV-2 RNA SPEC QL NAA+PROBE: SIGNIFICANT CHANGE UP
SODIUM SERPL-SCNC: 135 MMOL/L — SIGNIFICANT CHANGE UP (ref 135–145)
SODIUM SERPL-SCNC: 138 MMOL/L — SIGNIFICANT CHANGE UP (ref 135–145)
SPECIMEN SOURCE: SIGNIFICANT CHANGE UP
WBC # BLD: 28.82 K/UL — HIGH (ref 3.8–10.5)
WBC # BLD: 30.39 K/UL — HIGH (ref 3.8–10.5)
WBC # FLD AUTO: 28.82 K/UL — HIGH (ref 3.8–10.5)
WBC # FLD AUTO: 30.39 K/UL — HIGH (ref 3.8–10.5)

## 2020-04-03 PROCEDURE — 47490 INCISION OF GALLBLADDER: CPT

## 2020-04-03 PROCEDURE — 93010 ELECTROCARDIOGRAM REPORT: CPT

## 2020-04-03 PROCEDURE — 99223 1ST HOSP IP/OBS HIGH 75: CPT

## 2020-04-03 PROCEDURE — 71045 X-RAY EXAM CHEST 1 VIEW: CPT | Mod: 26

## 2020-04-03 RX ORDER — DIAZEPAM 5 MG
0 TABLET ORAL
Qty: 30 | Refills: 0 | DISCHARGE

## 2020-04-03 RX ORDER — HYDROCHLOROTHIAZIDE 25 MG
0 TABLET ORAL
Qty: 90 | Refills: 0 | DISCHARGE

## 2020-04-03 RX ORDER — PIPERACILLIN AND TAZOBACTAM 4; .5 G/20ML; G/20ML
3.38 INJECTION, POWDER, LYOPHILIZED, FOR SOLUTION INTRAVENOUS EVERY 6 HOURS
Refills: 0 | Status: DISCONTINUED | OUTPATIENT
Start: 2020-04-03 | End: 2020-04-03

## 2020-04-03 RX ORDER — PIPERACILLIN AND TAZOBACTAM 4; .5 G/20ML; G/20ML
3.38 INJECTION, POWDER, LYOPHILIZED, FOR SOLUTION INTRAVENOUS EVERY 8 HOURS
Refills: 0 | Status: DISCONTINUED | OUTPATIENT
Start: 2020-04-03 | End: 2020-04-05

## 2020-04-03 RX ORDER — SODIUM CHLORIDE 9 MG/ML
1000 INJECTION, SOLUTION INTRAVENOUS
Refills: 0 | Status: DISCONTINUED | OUTPATIENT
Start: 2020-04-03 | End: 2020-04-04

## 2020-04-03 RX ORDER — PIPERACILLIN AND TAZOBACTAM 4; .5 G/20ML; G/20ML
3.38 INJECTION, POWDER, LYOPHILIZED, FOR SOLUTION INTRAVENOUS ONCE
Refills: 0 | Status: COMPLETED | OUTPATIENT
Start: 2020-04-03 | End: 2020-04-03

## 2020-04-03 RX ORDER — AMLODIPINE BESYLATE 2.5 MG/1
0 TABLET ORAL
Qty: 90 | Refills: 0 | DISCHARGE

## 2020-04-03 RX ORDER — MAGNESIUM SULFATE 500 MG/ML
2 VIAL (ML) INJECTION ONCE
Refills: 0 | Status: COMPLETED | OUTPATIENT
Start: 2020-04-03 | End: 2020-04-04

## 2020-04-03 RX ORDER — ONDANSETRON 8 MG/1
4 TABLET, FILM COATED ORAL EVERY 6 HOURS
Refills: 0 | Status: DISCONTINUED | OUTPATIENT
Start: 2020-04-03 | End: 2020-04-05

## 2020-04-03 RX ORDER — POTASSIUM CHLORIDE 20 MEQ
10 PACKET (EA) ORAL
Refills: 0 | Status: COMPLETED | OUTPATIENT
Start: 2020-04-03 | End: 2020-04-03

## 2020-04-03 RX ORDER — PANTOPRAZOLE SODIUM 20 MG/1
40 TABLET, DELAYED RELEASE ORAL
Refills: 0 | Status: DISCONTINUED | OUTPATIENT
Start: 2020-04-03 | End: 2020-04-05

## 2020-04-03 RX ORDER — LOSARTAN POTASSIUM 100 MG/1
0 TABLET, FILM COATED ORAL
Qty: 90 | Refills: 0 | DISCHARGE

## 2020-04-03 RX ORDER — POTASSIUM CHLORIDE 20 MEQ
40 PACKET (EA) ORAL ONCE
Refills: 0 | Status: DISCONTINUED | OUTPATIENT
Start: 2020-04-03 | End: 2020-04-04

## 2020-04-03 RX ORDER — IPRATROPIUM/ALBUTEROL SULFATE 18-103MCG
3 AEROSOL WITH ADAPTER (GRAM) INHALATION EVERY 6 HOURS
Refills: 0 | Status: DISCONTINUED | OUTPATIENT
Start: 2020-04-03 | End: 2020-04-05

## 2020-04-03 RX ORDER — ATORVASTATIN CALCIUM 80 MG/1
0 TABLET, FILM COATED ORAL
Qty: 90 | Refills: 0 | DISCHARGE

## 2020-04-03 RX ORDER — ACETAMINOPHEN 500 MG
650 TABLET ORAL EVERY 6 HOURS
Refills: 0 | Status: DISCONTINUED | OUTPATIENT
Start: 2020-04-03 | End: 2020-04-05

## 2020-04-03 RX ORDER — AMLODIPINE BESYLATE 2.5 MG/1
5 TABLET ORAL DAILY
Refills: 0 | Status: DISCONTINUED | OUTPATIENT
Start: 2020-04-03 | End: 2020-04-05

## 2020-04-03 RX ADMIN — SODIUM CHLORIDE 75 MILLILITER(S): 9 INJECTION, SOLUTION INTRAVENOUS at 01:36

## 2020-04-03 RX ADMIN — Medication 100 MILLIEQUIVALENT(S): at 21:58

## 2020-04-03 RX ADMIN — Medication 100 MILLIEQUIVALENT(S): at 19:16

## 2020-04-03 RX ADMIN — Medication 100 MILLIEQUIVALENT(S): at 02:29

## 2020-04-03 RX ADMIN — PIPERACILLIN AND TAZOBACTAM 25 GRAM(S): 4; .5 INJECTION, POWDER, LYOPHILIZED, FOR SOLUTION INTRAVENOUS at 02:58

## 2020-04-03 RX ADMIN — SODIUM CHLORIDE 75 MILLILITER(S): 9 INJECTION, SOLUTION INTRAVENOUS at 16:26

## 2020-04-03 RX ADMIN — Medication 100 MILLIEQUIVALENT(S): at 03:43

## 2020-04-03 RX ADMIN — Medication 100 MILLIEQUIVALENT(S): at 04:42

## 2020-04-03 RX ADMIN — PIPERACILLIN AND TAZOBACTAM 25 GRAM(S): 4; .5 INJECTION, POWDER, LYOPHILIZED, FOR SOLUTION INTRAVENOUS at 14:45

## 2020-04-03 RX ADMIN — PANTOPRAZOLE SODIUM 40 MILLIGRAM(S): 20 TABLET, DELAYED RELEASE ORAL at 14:44

## 2020-04-03 RX ADMIN — Medication 100 MILLIEQUIVALENT(S): at 14:45

## 2020-04-03 NOTE — ED PROVIDER NOTE - OBJECTIVE STATEMENT
80 y/o F pt with hx of COPD presents to ED as a transfer from [facility] c/o intermittent abdominal cramping associated with nausea and constipation that began 4 days ago. Pt tried manual self-disimpaction with no relief of pain. Pt initially presented to [facility] with a fever of 103F, she had transient BP of 78/42 which responded to 2L of NS and was given Vancomycin and Zosyn. Pt had a WBC of 29.8 and had a CT abdomen which showed a distended gallbladder with pericholecystic fat stranding, wall thickening with a 2.1 cm gall stone in the fundus consistent with acute cholecystitis. Pt also notes some SOB, pt states she usually uses an inhaler and nebulizers for her COPD and does not wear O2 at baseline. Denies CP, vomiting and diarrhea. No further complaints at this time.

## 2020-04-03 NOTE — H&P ADULT - HISTORY OF PRESENT ILLNESS
Trauma and Acute Care Surgery Consult Note    HPI:   78yo Female w/ PMH COPD + HTN transferred from [facility] on 04-02-20 for tx of acute cholecystitis. Patient initially referred to [facility] by pulmonologist due to worsening SOB and concern for COVID. There she also revealed she had had RUQ pain for a day, subsequent RUQ ultrasound revealed impacted stone at GB neck and pericholecystic fluid. Due to medical comorbitities patient transferred to [facility] as likely needed cholecystostomy tube and [facility] lacks IR capabilities.      PMH:  COPD  Multiple falls     PSH:  multiple prior orthopedic surgeries.  Salpingoophorectomy for ovarian cyst    Home Medications:  AMLODIPINE BESYLATE  5 MG TABS:  (03 Apr 2020 01:33)  ATORVASTATIN CALCIUM  10 MG TABS:  (03 Apr 2020 01:33)  DIAZEPAM  5 MG TABS:  (03 Apr 2020 01:33)  HYDROCHLOROTHIAZIDE  12.5 MG TABS:  (03 Apr 2020 01:33)  LOSARTAN POTASSIUM  100 MG TABS:  (03 Apr 2020 01:33)      ALL:  NKDA    FMH:  Denies family history of gastrointestinal malignancy     SOC Hx:   Denies etoh, drug use. Former smoker    Physical Exam:   Gen: elderly female, NAD  Neuro: AOx3, EOMI, PERRLA, no gross deficit on exam  HEENT: No oral/mucosal lesions, no neck masses or lymphadenopathy  RESP: coarse bilateral BS, mild increased WOB but saturating well on NC  CVS: RRR,   GI: abd soft, RUQ Tenderness, ND, no rebound/guarding  Extremities: 2+ peripheral pulses

## 2020-04-03 NOTE — PROGRESS NOTE ADULT - SUBJECTIVE AND OBJECTIVE BOX
Vascular & Interventional Radiology Pre-Procedure Note    Procedure Name: Percutaneous cholecystostomy tube placement.    HPI: 79y Female with abdominal pain and stranding around gallbladder on CT. Surgical team defers surgery and is requesting percutaneous cholecystostomy tube placement.     Allergies:   Medications (Abx/Cardiac/Anticoagulation/Blood Products)    piperacillin/tazobactam IVPB..: 25 mL/Hr IV Intermittent (04-03 @ 02:58)    Data:  165.1  68  T(C): 36.9  HR: 69  BP: 85/51  RR: 18  SpO2: 98%    -WBC 28.82 / HgB 11.6 / Hct 35.7 / Plt 151  -Na 135 / Cl 100 / BUN 29.0 / Glucose 110  -K 2.9 / CO2 19.0 / Cr 1.85  -ALT 47 / Alk Phos 118 / T.Bili 1.1  -INR1.20    Plan:   -79y Female presents for percutaneous cholecystostomy tube placement. Procedure and risks discussed with patient and she is agreeable to proceed. DNR is modified for procedure in that she does not want chest compressions but is ok with intubation and medications. DNR will be back in full force after procedure.   -Risks/Benefits/alternatives explained with the patient and/or healthcare proxy and witnessed informed consent obtained.

## 2020-04-03 NOTE — CONSULT NOTE ADULT - ASSESSMENT
78yo with acute cholecystitis + r/o COVID. GYN consulted for possible recto-vaginal fistula. Exam performed with no fistula visualized. Previously noted discharge likely related to pessary. Recommended to patient to clean pessary as patient does this independently at home but reports not having done so recently. Unlikely to have fistula. Rest of care as per primary team.

## 2020-04-03 NOTE — CONSULT NOTE ADULT - PROBLEM SELECTOR RECOMMENDATION 9
With abnormal LFT's likely secondary to acute cholecystitis. Doubt choledocholithiasis. Agree with IR placed cholecystostomy tube. Trend LFT's while here.

## 2020-04-03 NOTE — PROGRESS NOTE ADULT - SUBJECTIVE AND OBJECTIVE BOX
feels ok   afebrile  abd soft mild RUQ tenderness   wbc 30    CT large stone  thickened wall ? air along medial wall   for IR today  Supportive care

## 2020-04-03 NOTE — PROGRESS NOTE ADULT - SUBJECTIVE AND OBJECTIVE BOX
Interventional Radiology Brief Post-Procedure Note    Procedure: Percutaneous cholecystostomy tube placement.     Operators: Bradley Oswald MD    Anesthesia (type): Provided by anesthesiology.     Contrast: None.    EBL: Minimal.    Findings/Follow up Plan of Care: Successful 8 [place] cholecystostomy tube placement.     Specimens Removed: 10 mL bile.     Implants: 8 [place] cholecystostomy tube.    Complications: No immediate complications.     Condition/Disposition: Back to inpatient room.     Please call Interventional Radiology x [phone] with any questions, concerns, or issues.

## 2020-04-03 NOTE — CHART NOTE - NSCHARTNOTEFT_GEN_A_CORE
POST-OPERATIVE NOTE    Patient is several hours s/p percutaneous cholecystostomy tube. Tolerated procedure well. Uncomplicated. Denies abdominal pain. Evaluated by GYN team for suspected colovaginal fistula after discharge noted from labia during a bowel movement/ Patient denies foul odor or vaginal discharge. No vaginal bleeding or pain. She is tolerating a diet without nausea or vomiting. Voiding appropriately. Offers no acute complaints at this time. No fever, CP, SOB.      Vital Signs Last 24 Hrs  T(C): 36.7 (03 Apr 2020 11:54), Max: 36.9 (02 Apr 2020 22:33)  T(F): 98.1 (03 Apr 2020 11:54), Max: 98.5 (02 Apr 2020 22:33)  HR: 69 (03 Apr 2020 11:54) (68 - 97)  BP: 93/64 (03 Apr 2020 11:54) (85/51 - 110/40)  BP(mean): --  RR: 20 (03 Apr 2020 11:54) (18 - 20)  SpO2: 96% (03 Apr 2020 11:54) (95% - 98%)  I&O's Detail    piperacillin/tazobactam IVPB.. 3.375  amLODIPine   Tablet 5  piperacillin/tazobactam IVPB.. 3.375    PAST MEDICAL & SURGICAL HISTORY:        Physical Exam:  General: NAD, resting comfortably in bed  Pulmonary: Nonlabored breathing, no respiratory distress  Cardiovascular: NSR  Abdominal: soft, NT/ND, perc chase tube RUQ, overlying dressing with some strike through, cholecystostomy tube with bilious drainage   Extremities: soft, NTTP      LABS:                        11.6   28.82 )-----------( 151      ( 03 Apr 2020 10:07 )             35.7     04-03    138  |  103  |  28.0<H>  ----------------------------<  76  3.2<L>   |  19.0<L>  |  1.59<H>    Ca    7.9<L>      03 Apr 2020 10:07  Phos  3.5     04-03  Mg     1.7     04-03    TPro  5.6<L>  /  Alb  2.8<L>  /  TBili  0.9  /  DBili  0.4<H>  /  AST  45<H>  /  ALT  39<H>  /  AlkPhos  107  04-03    PT/INR - ( 03 Apr 2020 00:25 )   PT: 13.6 sec;   INR: 1.20 ratio         PTT - ( 03 Apr 2020 00:25 )  PTT:27.6 sec  CAPILLARY BLOOD GLUCOSE        Assessment:  The patient is a 79y Female who is now several hours post-op from a percutaneous cholecystostomy tube by IR. Recovering appropriately. Evaluated by Gynecology team, pelvic exam revealed pessary in place with some normal vaginal discharge. Afebrile. VSS.     Plan:  - Pain control as needed  - DVT ppx  - OOB and ambulating as tolerated  - F/u AM labs  -COVID PCR pending   -Discharge planning

## 2020-04-03 NOTE — CONSULT NOTE ADULT - SUBJECTIVE AND OBJECTIVE BOX
HPI:  80yo Female w/ PMH COPD + HTN transferred from [facility] on 04-02-20 for tx of acute cholecystitis. Patient initially referred to [facility] by pulmonologist due to worsening SOB and concern for COVID. There she also revealed she had had RUQ pain for a day, subsequent RUQ ultrasound revealed impacted stone at GB neck and pericholecystic fluid. Due to medical comorbitities patient transferred to [facility] as likely needed cholecystostomy tube and [facility] lacks IR capabilities.     Here at [facility], received perc cholecystotomy tube placement today by IR.     GYN consulted for possible rectovaginal fistula    PMH:  COPD  Multiple falls     PSH:  multiple prior orthopedic surgeries.  Salpingoophorectomy for ovarian cyst    Home Medications:  AMLODIPINE BESYLATE  5 MG TABS:  (03 Apr 2020 01:33)  ATORVASTATIN CALCIUM  10 MG TABS:  (03 Apr 2020 01:33)  DIAZEPAM  5 MG TABS:  (03 Apr 2020 01:33)  HYDROCHLOROTHIAZIDE  12.5 MG TABS:  (03 Apr 2020 01:33)  LOSARTAN POTASSIUM  100 MG TABS:  (03 Apr 2020 01:33)    ALL:  NKDA    FMH:  Denies family history of gastrointestinal malignancy     SOC Hx:   Denies etoh, drug use. Former smoker    Vital Signs Last 24 Hrs  T(C): 36.7 (03 Apr 2020 11:54), Max: 36.9 (02 Apr 2020 22:33)  T(F): 98.1 (03 Apr 2020 11:54), Max: 98.5 (02 Apr 2020 22:33)  HR: 69 (03 Apr 2020 11:54) (68 - 97)  BP: 93/64 (03 Apr 2020 11:54) (85/51 - 110/40)  BP(mean): --  RR: 20 (03 Apr 2020 11:54) (18 - 20)  SpO2: 96% (03 Apr 2020 11:54) (95% - 98%)     PHYSICAL EXAM:  CHEST/LUNG: as per primary team  HEART: as per primary  ABDOMEN: as per primary  PELVIC:        EXTERNAL GENITALIA: Normal. No rashes or lesions noted.         VAGINA: pessary noted, atrophic mucosa typical of postmenopausal patient, no gross lesions, white/green discharge noted around pessary, no blood noted, no evidence of stool noted. No fistula noted on digital vaginal exam         CERVIX: unable to be visualized due to pessary        ADNEXA: no adnexal masses or tenderness appreciated.    LABS:                        11.6   28.82 )-----------( 151      ( 03 Apr 2020 10:07 )             35.7     04-03    138  |  103  |  28.0<H>  ----------------------------<  76  3.2<L>   |  19.0<L>  |  1.59<H>    Ca    7.9<L>      03 Apr 2020 10:07  Phos  3.5     04-03  Mg     1.7     04-03    TPro  5.6<L>  /  Alb  2.8<L>  /  TBili  0.9  /  DBili  0.4<H>  /  AST  45<H>  /  ALT  39<H>  /  AlkPhos  107  04-03          RADIOLOGY STUDIES:

## 2020-04-03 NOTE — CONSULT NOTE ADULT - SUBJECTIVE AND OBJECTIVE BOX
Patient is a 79y old  Female who presents with a chief complaint of acute cholecystitits + r/o COVID (03 Apr 2020 01:29)    HPI:   78yo Female w/ PMH COPD + HTN transferred from [facility] on 04-02-20 for tx of acute cholecystitis. Patient initially referred to [facility] by pulmonologist due to worsening SOB and concern for COVID. There she also revealed she had had RUQ pain for a day, subsequent RUQ ultrasound revealed impacted stone at GB neck and pericholecystic fluid. Due to medical comorbitities patient transferred to [facility] as likely needed cholecystostomy tube and [facility] lacks IR capabilities. Pt. seen this AM and states that she is having much less RUQ pain. Mildly elevated liver chemistries likely secondary to acute cholecystitis.    PMH:  COPD  Multiple falls     PSH:  multiple prior orthopedic surgeries.  Salpingoophorectomy for ovarian cyst    Home Medications:  AMLODIPINE BESYLATE  5 MG TABS:  (03 Apr 2020 01:33)  ATORVASTATIN CALCIUM  10 MG TABS:  (03 Apr 2020 01:33)  DIAZEPAM  5 MG TABS:  (03 Apr 2020 01:33)  HYDROCHLOROTHIAZIDE  12.5 MG TABS:  (03 Apr 2020 01:33)  LOSARTAN POTASSIUM  100 MG TABS:  (03 Apr 2020 01:33)      ALL:  NKDA    FMH:  Denies family history of gastrointestinal malignancy     SOC Hx:   Denies etoh, drug use. Former smoker    Physical Exam:   Gen: elderly female, NAD  Neuro: AOx3, EOMI, PERRLA, no gross deficit on exam  HEENT: No oral/mucosal lesions, no neck masses or lymphadenopathy  RESP: coarse bilateral BS, mild increased WOB but saturating well on NC  CVS: RRR,   GI: abd soft, Mild RUQ Tenderness, ND, no rebound/guarding or HSM. No Ochoa's sign.  Extremities: 2+ peripheral pulses (03 Apr 2020 01:29)      MEDICATIONS:  MEDICATIONS  (STANDING):  albuterol/ipratropium for Nebulization 3 milliLiter(s) Nebulizer every 6 hours  amLODIPine   Tablet 5 milliGRAM(s) Oral daily  lactated ringers. 1000 milliLiter(s) (75 mL/Hr) IV Continuous <Continuous>  pantoprazole    Tablet 40 milliGRAM(s) Oral before breakfast    MEDICATIONS  (PRN):  acetaminophen   Tablet .. 650 milliGRAM(s) Oral every 6 hours PRN Temp greater or equal to 38C (100.4F), Mild Pain (1 - 3)  ondansetron Injectable 4 milliGRAM(s) IV Push every 6 hours PRN Nausea      Allergies    No Known Allergies    Intolerances        Vital Signs Last 24 Hrs  T(C): 36.9 (02 Apr 2020 22:33), Max: 36.9 (02 Apr 2020 22:33)  T(F): 98.5 (02 Apr 2020 22:33), Max: 98.5 (02 Apr 2020 22:33)  HR: 69 (03 Apr 2020 06:00) (69 - 97)  BP: 85/51 (03 Apr 2020 06:00) (85/51 - 110/40)  BP(mean): --  RR: 18 (03 Apr 2020 06:00) (18 - 18)  SpO2: 98% (03 Apr 2020 06:00) (97% - 98%)      LABS:                        12.1   30.39 )-----------( 188      ( 03 Apr 2020 00:25 )             36.5     04-03    135  |  100  |  29.0<H>  ----------------------------<  110<H>  2.9<LL>   |  19.0<L>  |  1.85<H>    Ca    7.7<L>      03 Apr 2020 00:25    TPro  5.5<L>  /  Alb  2.6<L>  /  TBili  1.1  /  DBili  x   /  AST  66<H>  /  ALT  47<H>  /  AlkPhos  118  04-03          RADIOLOGY & ADDITIONAL STUDIES:   Noted above.

## 2020-04-03 NOTE — ED PROVIDER NOTE - CLINICAL SUMMARY MEDICAL DECISION MAKING FREE TEXT BOX
Transfer from OSH with imaging suggestive of acute cholecystitis. Pain controlled at this time, hemodynamically stable. Antibiotics and fluids given at OSH prior to [facility] arrival.

## 2020-04-03 NOTE — H&P ADULT - ASSESSMENT
80yo female w/ COPD and possible COVID as well as acute cholecystitis. Due to medical comorbidities, patient likely best treated w/ cholecystostomy tube at this time. In addition, extensive conversation had with patient who expressed desire to be DNR/DNI in event of cardiac arrest but would accept trial of intubation if felt that patien'ts condition was reversible.     - IV Zosyn  - F/u COVID Status  - Maintain sats > 90% on NC  - IR consult for cholecystostomy tube in AM  - Hold dvt ppx due to possible IR procedure  - f/u ABG  - DNR in event of cardiac arrest. 78yo female w/ COPD and possible COVID as well as acute cholecystitis. Due to medical comorbidities, patient likely best treated w/ cholecystostomy tube at this time. In addition, extensive conversation had with patient who expressed desire to be DNR/DNI in event of cardiac arrest but would accept trial of intubation if felt that patien'ts condition was reversible.     - IV Zosyn  - F/u COVID Status  - Maintain sats > 90% on NC  - IR consult for cholecystostomy tube in AM  - Hold dvt ppx due to possible IR procedure  - f/u ABG  - DNR in event of cardiac arrest.   - Admit to surgery under Dr. Hollis

## 2020-04-04 LAB
ALBUMIN SERPL ELPH-MCNC: 2.6 G/DL — LOW (ref 3.3–5.2)
ALP SERPL-CCNC: 95 U/L — SIGNIFICANT CHANGE UP (ref 40–120)
ALT FLD-CCNC: 30 U/L — SIGNIFICANT CHANGE UP
ANION GAP SERPL CALC-SCNC: 17 MMOL/L — SIGNIFICANT CHANGE UP (ref 5–17)
ANISOCYTOSIS BLD QL: SLIGHT — SIGNIFICANT CHANGE UP
APTT BLD: 25.3 SEC — LOW (ref 27.5–36.3)
AST SERPL-CCNC: 24 U/L — SIGNIFICANT CHANGE UP
BASOPHILS # BLD AUTO: 0 K/UL — SIGNIFICANT CHANGE UP (ref 0–0.2)
BASOPHILS NFR BLD AUTO: 0 % — SIGNIFICANT CHANGE UP (ref 0–2)
BILIRUB DIRECT SERPL-MCNC: 0.2 MG/DL — SIGNIFICANT CHANGE UP (ref 0–0.3)
BILIRUB INDIRECT FLD-MCNC: 0.3 MG/DL — SIGNIFICANT CHANGE UP (ref 0.2–1)
BILIRUB SERPL-MCNC: 0.5 MG/DL — SIGNIFICANT CHANGE UP (ref 0.4–2)
BLD GP AB SCN SERPL QL: SIGNIFICANT CHANGE UP
BUN SERPL-MCNC: 26 MG/DL — HIGH (ref 8–20)
CALCIUM SERPL-MCNC: 8.4 MG/DL — LOW (ref 8.6–10.2)
CHLORIDE SERPL-SCNC: 102 MMOL/L — SIGNIFICANT CHANGE UP (ref 98–107)
CO2 SERPL-SCNC: 18 MMOL/L — LOW (ref 22–29)
CREAT SERPL-MCNC: 1.12 MG/DL — SIGNIFICANT CHANGE UP (ref 0.5–1.3)
ELLIPTOCYTES BLD QL SMEAR: SLIGHT — SIGNIFICANT CHANGE UP
EOSINOPHIL # BLD AUTO: 0 K/UL — SIGNIFICANT CHANGE UP (ref 0–0.5)
EOSINOPHIL NFR BLD AUTO: 0 % — SIGNIFICANT CHANGE UP (ref 0–6)
GLUCOSE SERPL-MCNC: 87 MG/DL — SIGNIFICANT CHANGE UP (ref 70–99)
HCT VFR BLD CALC: 35.6 % — SIGNIFICANT CHANGE UP (ref 34.5–45)
HGB BLD-MCNC: 11.8 G/DL — SIGNIFICANT CHANGE UP (ref 11.5–15.5)
HYPOCHROMIA BLD QL: SLIGHT — SIGNIFICANT CHANGE UP
INR BLD: 1.07 RATIO — SIGNIFICANT CHANGE UP (ref 0.88–1.16)
LYMPHOCYTES # BLD AUTO: 1.19 K/UL — SIGNIFICANT CHANGE UP (ref 1–3.3)
LYMPHOCYTES # BLD AUTO: 6.1 % — LOW (ref 13–44)
MACROCYTES BLD QL: SLIGHT — SIGNIFICANT CHANGE UP
MAGNESIUM SERPL-MCNC: 2.5 MG/DL — SIGNIFICANT CHANGE UP (ref 1.6–2.6)
MANUAL SMEAR VERIFICATION: SIGNIFICANT CHANGE UP
MCHC RBC-ENTMCNC: 29.3 PG — SIGNIFICANT CHANGE UP (ref 27–34)
MCHC RBC-ENTMCNC: 33.1 GM/DL — SIGNIFICANT CHANGE UP (ref 32–36)
MCV RBC AUTO: 88.3 FL — SIGNIFICANT CHANGE UP (ref 80–100)
MICROCYTES BLD QL: SLIGHT — SIGNIFICANT CHANGE UP
MONOCYTES # BLD AUTO: 0 K/UL — SIGNIFICANT CHANGE UP (ref 0–0.9)
MONOCYTES NFR BLD AUTO: 0 % — LOW (ref 2–14)
NEUTROPHILS # BLD AUTO: 18.28 K/UL — HIGH (ref 1.8–7.4)
NEUTROPHILS NFR BLD AUTO: 91.3 % — HIGH (ref 43–77)
NEUTS BAND # BLD: 2.6 % — SIGNIFICANT CHANGE UP (ref 0–8)
OVALOCYTES BLD QL SMEAR: SLIGHT — SIGNIFICANT CHANGE UP
PHOSPHATE SERPL-MCNC: 2.4 MG/DL — SIGNIFICANT CHANGE UP (ref 2.4–4.7)
PLAT MORPH BLD: ABNORMAL
PLATELET # BLD AUTO: 182 K/UL — SIGNIFICANT CHANGE UP (ref 150–400)
PLATELET COUNT - ESTIMATE: NORMAL — SIGNIFICANT CHANGE UP
POIKILOCYTOSIS BLD QL AUTO: SLIGHT — SIGNIFICANT CHANGE UP
POTASSIUM SERPL-MCNC: 3.4 MMOL/L — LOW (ref 3.5–5.3)
POTASSIUM SERPL-SCNC: 3.4 MMOL/L — LOW (ref 3.5–5.3)
PROT SERPL-MCNC: 5.8 G/DL — LOW (ref 6.6–8.7)
PROTHROM AB SERPL-ACNC: 12.1 SEC — SIGNIFICANT CHANGE UP (ref 10–12.9)
RBC # BLD: 4.03 M/UL — SIGNIFICANT CHANGE UP (ref 3.8–5.2)
RBC # FLD: 13.2 % — SIGNIFICANT CHANGE UP (ref 10.3–14.5)
RBC BLD AUTO: ABNORMAL
SODIUM SERPL-SCNC: 137 MMOL/L — SIGNIFICANT CHANGE UP (ref 135–145)
WBC # BLD: 19.47 K/UL — HIGH (ref 3.8–10.5)
WBC # FLD AUTO: 19.47 K/UL — HIGH (ref 3.8–10.5)

## 2020-04-04 PROCEDURE — 99232 SBSQ HOSP IP/OBS MODERATE 35: CPT

## 2020-04-04 PROCEDURE — 99233 SBSQ HOSP IP/OBS HIGH 50: CPT

## 2020-04-04 RX ORDER — POTASSIUM CHLORIDE 20 MEQ
40 PACKET (EA) ORAL ONCE
Refills: 0 | Status: COMPLETED | OUTPATIENT
Start: 2020-04-04 | End: 2020-04-04

## 2020-04-04 RX ORDER — POTASSIUM CHLORIDE 20 MEQ
20 PACKET (EA) ORAL ONCE
Refills: 0 | Status: COMPLETED | OUTPATIENT
Start: 2020-04-04 | End: 2020-04-04

## 2020-04-04 RX ORDER — POTASSIUM CHLORIDE 20 MEQ
20 PACKET (EA) ORAL ONCE
Refills: 0 | Status: DISCONTINUED | OUTPATIENT
Start: 2020-04-04 | End: 2020-04-04

## 2020-04-04 RX ORDER — TRAMADOL HYDROCHLORIDE 50 MG/1
25 TABLET ORAL EVERY 4 HOURS
Refills: 0 | Status: DISCONTINUED | OUTPATIENT
Start: 2020-04-04 | End: 2020-04-05

## 2020-04-04 RX ORDER — ENOXAPARIN SODIUM 100 MG/ML
40 INJECTION SUBCUTANEOUS DAILY
Refills: 0 | Status: DISCONTINUED | OUTPATIENT
Start: 2020-04-04 | End: 2020-04-05

## 2020-04-04 RX ADMIN — Medication 650 MILLIGRAM(S): at 13:20

## 2020-04-04 RX ADMIN — PIPERACILLIN AND TAZOBACTAM 25 GRAM(S): 4; .5 INJECTION, POWDER, LYOPHILIZED, FOR SOLUTION INTRAVENOUS at 01:30

## 2020-04-04 RX ADMIN — PIPERACILLIN AND TAZOBACTAM 25 GRAM(S): 4; .5 INJECTION, POWDER, LYOPHILIZED, FOR SOLUTION INTRAVENOUS at 22:20

## 2020-04-04 RX ADMIN — Medication 50 GRAM(S): at 01:30

## 2020-04-04 RX ADMIN — Medication 20 MILLIEQUIVALENT(S): at 15:39

## 2020-04-04 RX ADMIN — ENOXAPARIN SODIUM 40 MILLIGRAM(S): 100 INJECTION SUBCUTANEOUS at 15:39

## 2020-04-04 RX ADMIN — Medication 40 MILLIEQUIVALENT(S): at 11:35

## 2020-04-04 RX ADMIN — PANTOPRAZOLE SODIUM 40 MILLIGRAM(S): 20 TABLET, DELAYED RELEASE ORAL at 05:59

## 2020-04-04 RX ADMIN — PIPERACILLIN AND TAZOBACTAM 25 GRAM(S): 4; .5 INJECTION, POWDER, LYOPHILIZED, FOR SOLUTION INTRAVENOUS at 15:39

## 2020-04-04 RX ADMIN — AMLODIPINE BESYLATE 5 MILLIGRAM(S): 2.5 TABLET ORAL at 05:59

## 2020-04-04 RX ADMIN — Medication 650 MILLIGRAM(S): at 20:57

## 2020-04-04 RX ADMIN — PIPERACILLIN AND TAZOBACTAM 25 GRAM(S): 4; .5 INJECTION, POWDER, LYOPHILIZED, FOR SOLUTION INTRAVENOUS at 08:46

## 2020-04-04 NOTE — PROGRESS NOTE ADULT - SUBJECTIVE AND OBJECTIVE BOX
Patient is a 79y old  Female who presents with a chief complaint of acute cholecystitits + r/o COVID (04 Apr 2020 02:36)      HPI:      HPI:   78yo Female w/ PMH COPD + HTN transferred from [facility] on 04-02-20 for tx of acute cholecystitis. Cholecystostomy tube placed yesterday. Pt has mild discomfort  at cholecystomy tube site. No fevers. No nausea or vomiting.  WBC improving. COVID negative.  I spoke to daughter on the phone      PMH:  COPD  Multiple falls     PSH:  multiple prior orthopedic surgeries.  Salpingoophorectomy for ovarian cyst    Home Medications:  AMLODIPINE BESYLATE  5 MG TABS:  (03 Apr 2020 01:33)  ATORVASTATIN CALCIUM  10 MG TABS:  (03 Apr 2020 01:33)  DIAZEPAM  5 MG TABS:  (03 Apr 2020 01:33)  HYDROCHLOROTHIAZIDE  12.5 MG TABS:  (03 Apr 2020 01:33)  LOSARTAN POTASSIUM  100 MG TABS:  (03 Apr 2020 01:33)      ALL:  NKDA    FMH:  Denies family history of gastrointestinal malignancy     SOC Hx:   Denies etoh, drug use. Former smoker          SOCIAL HISTORY:  Smoking Status: [ ] Current, [ ] Former, [ ] Never  Pack Years:  [  ] EtOH  [  ] IVDA    MEDICATIONS:  MEDICATIONS  (STANDING):  albuterol/ipratropium for Nebulization 3 milliLiter(s) Nebulizer every 6 hours  amLODIPine   Tablet 5 milliGRAM(s) Oral daily  enoxaparin Injectable 40 milliGRAM(s) SubCutaneous daily  pantoprazole    Tablet 40 milliGRAM(s) Oral before breakfast  piperacillin/tazobactam IVPB.. 3.375 Gram(s) IV Intermittent every 8 hours  potassium chloride    Tablet ER 20 milliEquivalent(s) Oral once    MEDICATIONS  (PRN):  acetaminophen   Tablet .. 650 milliGRAM(s) Oral every 6 hours PRN Temp greater or equal to 38C (100.4F), Mild Pain (1 - 3)  ondansetron Injectable 4 milliGRAM(s) IV Push every 6 hours PRN Nausea      Allergies    No Known Allergies    Intolerances        Vital Signs Last 24 Hrs  T(C): 36.6 (04 Apr 2020 08:25), Max: 36.7 (04 Apr 2020 04:27)  T(F): 97.8 (04 Apr 2020 08:25), Max: 98.1 (04 Apr 2020 04:27)  HR: 86 (04 Apr 2020 08:25) (73 - 86)  BP: 108/68 (04 Apr 2020 08:25) (90/59 - 113/79)  BP(mean): 91 (04 Apr 2020 04:27) (91 - 91)  RR: 20 (04 Apr 2020 08:25) (20 - 20)  SpO2: 95% (04 Apr 2020 08:25) (95% - 97%)        PHYSICAL EXAM:    General: elderly ; well nourished; in no acute distress  HEENT: MMM, conjunctiva and sclera clear  Gastrointestinal: Soft, non-tender non-distended; Normal bowel sounds; No rebound or guarding- no erythema around the cholecystomy tube  Extremities: Normal range of motion, No clubbing, cyanosis or edema  Neurological: Alert and oriented x3  Skin: Warm and dry. No obvious rash      LABS:                        11.8   19.47 )-----------( 182      ( 04 Apr 2020 08:14 )             35.6     04 Apr 2020 08:14    137    |  102    |  26.0   ----------------------------<  87     3.4     |  18.0   |  1.12     Ca    8.4        04 Apr 2020 08:14  Phos  2.4       04 Apr 2020 08:14  Mg     2.5       04 Apr 2020 08:14    TPro  5.8    /  Alb  2.6    /  TBili  0.5    /  DBili  0.2    /  AST  24     /  ALT  30     /  AlkPhos  95     / Amylase x      /Lipase x      04 Apr 2020 08:14              RADIOLOGY & ADDITIONAL STUDIES: Patient is a 79y old  Female who presents with a chief complaint of acute cholecystitits + r/o COVID (04 Apr 2020 02:36)      HPI:      HPI:   78yo Female w/ PMH COPD + HTN transferred from [facility] [place] [place] on 04-02-20 for tx of acute cholecystitis ( U/S showed impacted stone at GB neck and pericholecystic fluid) . Cholecystostomy tube placed yesterday. Pt has mild discomfort  at cholecystomy tube site. No fevers. No nausea or vomiting.  WBC improving. COVID negative.  I spoke to daughter on the phone      PMH:  COPD  Multiple falls     PSH:  multiple prior orthopedic surgeries.  Salpingoophorectomy for ovarian cyst    Home Medications:  AMLODIPINE BESYLATE  5 MG TABS:  (03 Apr 2020 01:33)  ATORVASTATIN CALCIUM  10 MG TABS:  (03 Apr 2020 01:33)  DIAZEPAM  5 MG TABS:  (03 Apr 2020 01:33)  HYDROCHLOROTHIAZIDE  12.5 MG TABS:  (03 Apr 2020 01:33)  LOSARTAN POTASSIUM  100 MG TABS:  (03 Apr 2020 01:33)      ALL:  NKDA    FMH:  Denies family history of gastrointestinal malignancy     SOC Hx:   Denies etoh, drug use. Former smoker          SOCIAL HISTORY:  Smoking Status: [ ] Current, [ ] Former, [ ] Never  Pack Years:  [  ] EtOH  [  ] IVDA    MEDICATIONS:  MEDICATIONS  (STANDING):  albuterol/ipratropium for Nebulization 3 milliLiter(s) Nebulizer every 6 hours  amLODIPine   Tablet 5 milliGRAM(s) Oral daily  enoxaparin Injectable 40 milliGRAM(s) SubCutaneous daily  pantoprazole    Tablet 40 milliGRAM(s) Oral before breakfast  piperacillin/tazobactam IVPB.. 3.375 Gram(s) IV Intermittent every 8 hours  potassium chloride    Tablet ER 20 milliEquivalent(s) Oral once    MEDICATIONS  (PRN):  acetaminophen   Tablet .. 650 milliGRAM(s) Oral every 6 hours PRN Temp greater or equal to 38C (100.4F), Mild Pain (1 - 3)  ondansetron Injectable 4 milliGRAM(s) IV Push every 6 hours PRN Nausea      Allergies    No Known Allergies    Intolerances        Vital Signs Last 24 Hrs  T(C): 36.6 (04 Apr 2020 08:25), Max: 36.7 (04 Apr 2020 04:27)  T(F): 97.8 (04 Apr 2020 08:25), Max: 98.1 (04 Apr 2020 04:27)  HR: 86 (04 Apr 2020 08:25) (73 - 86)  BP: 108/68 (04 Apr 2020 08:25) (90/59 - 113/79)  BP(mean): 91 (04 Apr 2020 04:27) (91 - 91)  RR: 20 (04 Apr 2020 08:25) (20 - 20)  SpO2: 95% (04 Apr 2020 08:25) (95% - 97%)        PHYSICAL EXAM:    General: elderly ; well nourished; in no acute distress  HEENT: MMM, conjunctiva and sclera clear  Gastrointestinal: Soft, non-tender non-distended; Normal bowel sounds; No rebound or guarding- no erythema around the cholecystomy tube  Extremities: Normal range of motion, No clubbing, cyanosis or edema  Neurological: Alert and oriented x3  Skin: Warm and dry. No obvious rash      LABS:                        11.8   19.47 )-----------( 182      ( 04 Apr 2020 08:14 )             35.6     04 Apr 2020 08:14    137    |  102    |  26.0   ----------------------------<  87     3.4     |  18.0   |  1.12     Ca    8.4        04 Apr 2020 08:14  Phos  2.4       04 Apr 2020 08:14  Mg     2.5       04 Apr 2020 08:14    TPro  5.8    /  Alb  2.6    /  TBili  0.5    /  DBili  0.2    /  AST  24     /  ALT  30     /  AlkPhos  95     / Amylase x      /Lipase x      04 Apr 2020 08:14              RADIOLOGY & ADDITIONAL STUDIES:

## 2020-04-04 NOTE — PROGRESS NOTE ADULT - PROBLEM SELECTOR PLAN 1
cholecystomy tube in place. Timing of cholecystectomy as per surger  diet as per surgery  - follow LFT  - IV antibiotics cholecystomy tube in place. Timing of cholecystectomy as per surgery  diet as per surgery  - follow LFT- now normal   - IV antibiotics

## 2020-04-04 NOTE — PROGRESS NOTE ADULT - ASSESSMENT
78yo female with acute cholecystitis now POD 1 following IR perc chase    Feeling well, pain improved.   No evidence of worsening infection    Will followup WBC today, if downtrending will consider CLD with ensure clear today  Continue IV abx, will likely transition to PO tomorrow if continued improvement  OOB to chair  SCDs, Lov PPx

## 2020-04-04 NOTE — PROGRESS NOTE ADULT - SUBJECTIVE AND OBJECTIVE BOX
INTERVAL HPI/OVERNIGHT EVENTS:  No acute events.  Percutaneous cholecystostomy tube placed by IR yesterday, mild pain at insertion site, though no abdominal pain otherwise.  Denies f/c/n/v.  Passing flatus, last BM day prior to admission (Baseline constipation).  Gyn consult yesterday for drainage from vaginal os, reported hx of pessary in place which she self cleans at home, no signs of colovaginal fistula.  Denies chest pain, dyspnea.  Urinating independently.  Remains NPO.    STATUS POST:  IR Percutaneous Cholecystostomy    POST OPERATIVE DAY #:  1    SUBJECTIVE:  Flatus: [x ] YES [ ] NO             Bowel Movement: [ ] YES [ x] NO  Pain (0-10):            Pain Control Adequate: [x ] YES [ ] NO  Nausea: [ ] YES [ x] NO            Vomiting: [ ] YES [ ] NO  Diarrhea: [ ] YES [x ] NO         Constipation: [ ] YES [ x] NO     Chest Pain: [ ] YES [x ] NO    SOB:  [ ] YES [x ] NO    MEDICATIONS  (STANDING):  albuterol/ipratropium for Nebulization 3 milliLiter(s) Nebulizer every 6 hours  amLODIPine   Tablet 5 milliGRAM(s) Oral daily  lactated ringers. 1000 milliLiter(s) (75 mL/Hr) IV Continuous <Continuous>  pantoprazole    Tablet 40 milliGRAM(s) Oral before breakfast  piperacillin/tazobactam IVPB.. 3.375 Gram(s) IV Intermittent every 8 hours  potassium chloride    Tablet ER 40 milliEquivalent(s) Oral once    MEDICATIONS  (PRN):  acetaminophen   Tablet .. 650 milliGRAM(s) Oral every 6 hours PRN Temp greater or equal to 38C (100.4F), Mild Pain (1 - 3)  ondansetron Injectable 4 milliGRAM(s) IV Push every 6 hours PRN Nausea      Vital Signs Last 24 Hrs  T(C): 36.5 (03 Apr 2020 19:50), Max: 36.7 (03 Apr 2020 11:54)  T(F): 97.7 (03 Apr 2020 19:50), Max: 98.1 (03 Apr 2020 11:54)  HR: 73 (03 Apr 2020 19:50) (68 - 73)  BP: 90/59 (03 Apr 2020 19:50) (85/51 - 93/64)  BP(mean): --  RR: 20 (03 Apr 2020 19:50) (18 - 20)  SpO2: 97% (03 Apr 2020 19:50) (95% - 98%)    PHYSICAL EXAM:  GEN: NAD, laying in bed, appears stated age  HEENT: NCAT, clear oral mucosa, normal conjunctiva  Chest: non-tender  CV:  non-tachycardic, 2+ radial pulse  Pulm: no increased work of breathing, no conversational dyspnea  GI: soft, non tender, perc chase in place with bilious drainage, dressing clean  MSK: moving all extremities       I&O's Detail      LABS:                        11.6   28.82 )-----------( 151      ( 03 Apr 2020 10:07 )             35.7     04-03    138  |  103  |  28.0<H>  ----------------------------<  76  3.2<L>   |  19.0<L>  |  1.59<H>    Ca    7.9<L>      03 Apr 2020 10:07  Phos  3.5     04-03  Mg     1.7     04-03    TPro  5.6<L>  /  Alb  2.8<L>  /  TBili  0.9  /  DBili  0.4<H>  /  AST  45<H>  /  ALT  39<H>  /  AlkPhos  107  04-03    PT/INR - ( 03 Apr 2020 00:25 )   PT: 13.6 sec;   INR: 1.20 ratio         PTT - ( 03 Apr 2020 00:25 )  PTT:27.6 sec      RADIOLOGY & ADDITIONAL STUDIES: INTERVAL HPI/OVERNIGHT EVENTS:  No acute events.  Percutaneous cholecystostomy tube placed by IR yesterday, mild pain at insertion site, though no abdominal pain otherwise.  Denies f/c/n/v.  Passing flatus, last BM day prior to admission (Baseline constipation).  Gyn consult yesterday for drainage from vaginal os, reported hx of pessary in place which she self cleans at home, no signs of colovaginal fistula.  Denies chest pain, dyspnea.  Urinating independently.  Remains NPO.    STATUS POST:  IR Percutaneous Cholecystostomy    POST OPERATIVE DAY #:  1    SUBJECTIVE:  Flatus: [x ] YES [ ] NO             Bowel Movement: [ ] YES [ x] NO  Pain (0-10):            Pain Control Adequate: [x ] YES [ ] NO  Nausea: [ ] YES [ x] NO            Vomiting: [ ] YES [ ] NO  Diarrhea: [ ] YES [x ] NO         Constipation: [ ] YES [ x] NO     Chest Pain: [ ] YES [x ] NO    SOB:  [ ] YES [x ] NO    MEDICATIONS  (STANDING):  albuterol/ipratropium for Nebulization 3 milliLiter(s) Nebulizer every 6 hours  amLODIPine   Tablet 5 milliGRAM(s) Oral daily  lactated ringers. 1000 milliLiter(s) (75 mL/Hr) IV Continuous <Continuous>  pantoprazole    Tablet 40 milliGRAM(s) Oral before breakfast  piperacillin/tazobactam IVPB.. 3.375 Gram(s) IV Intermittent every 8 hours  potassium chloride    Tablet ER 40 milliEquivalent(s) Oral once    MEDICATIONS  (PRN):  acetaminophen   Tablet .. 650 milliGRAM(s) Oral every 6 hours PRN Temp greater or equal to 38C (100.4F), Mild Pain (1 - 3)  ondansetron Injectable 4 milliGRAM(s) IV Push every 6 hours PRN Nausea      Vital Signs Last 24 Hrs  T(C): 36.5 (03 Apr 2020 19:50), Max: 36.7 (03 Apr 2020 11:54)  T(F): 97.7 (03 Apr 2020 19:50), Max: 98.1 (03 Apr 2020 11:54)  HR: 73 (03 Apr 2020 19:50) (68 - 73)  BP: 90/59 (03 Apr 2020 19:50) (85/51 - 93/64)  RR: 20 (03 Apr 2020 19:50) (18 - 20)  SpO2: 97% (03 Apr 2020 19:50) (95% - 98%)    PHYSICAL EXAM:  GEN: NAD, laying in bed, appears stated age  HEENT: NCAT, clear oral mucosa, normal conjunctiva  Chest: non-tender  CV:  non-tachycardic, 2+ radial pulse  Pulm: no increased work of breathing, no conversational dyspnea  GI: soft, non tender, perc chase in place with bilious drainage, dressing clean  MSK: moving all extremities       I&O's Detail      LABS:                        11.6   28.82 )-----------( 151      ( 03 Apr 2020 10:07 )             35.7     04-03    138  |  103  |  28.0<H>  ----------------------------<  76  3.2<L>   |  19.0<L>  |  1.59<H>    Ca    7.9<L>      03 Apr 2020 10:07  Phos  3.5     04-03  Mg     1.7     04-03    TPro  5.6<L>  /  Alb  2.8<L>  /  TBili  0.9  /  DBili  0.4<H>  /  AST  45<H>  /  ALT  39<H>  /  AlkPhos  107  04-03    PT/INR - ( 03 Apr 2020 00:25 )   PT: 13.6 sec;   INR: 1.20 ratio         PTT - ( 03 Apr 2020 00:25 )  PTT:27.6 sec      RADIOLOGY & ADDITIONAL STUDIES:

## 2020-04-04 NOTE — PROVIDER CONTACT NOTE (CRITICAL VALUE NOTIFICATION) - TEST AND RESULT REPORTED:
body fluid cx positive  moderate escherichia coli susceptibility to follow  moderate enterecoccus rassinosus susceptibility to follow  few gram neg rods identification to follow

## 2020-04-05 ENCOUNTER — TRANSCRIPTION ENCOUNTER (OUTPATIENT)
Age: 80
End: 2020-04-05

## 2020-04-05 VITALS
HEART RATE: 84 BPM | SYSTOLIC BLOOD PRESSURE: 113 MMHG | TEMPERATURE: 97 F | DIASTOLIC BLOOD PRESSURE: 66 MMHG | OXYGEN SATURATION: 94 % | RESPIRATION RATE: 20 BRPM

## 2020-04-05 LAB
-  AMIKACIN: SIGNIFICANT CHANGE UP
-  AMIKACIN: SIGNIFICANT CHANGE UP
-  AMOXICILLIN/CLAVULANIC ACID: SIGNIFICANT CHANGE UP
-  AMOXICILLIN/CLAVULANIC ACID: SIGNIFICANT CHANGE UP
-  AMPICILLIN/SULBACTAM: SIGNIFICANT CHANGE UP
-  AMPICILLIN/SULBACTAM: SIGNIFICANT CHANGE UP
-  AMPICILLIN: SIGNIFICANT CHANGE UP
-  AMPICILLIN: SIGNIFICANT CHANGE UP
-  AZTREONAM: SIGNIFICANT CHANGE UP
-  AZTREONAM: SIGNIFICANT CHANGE UP
-  CEFAZOLIN: SIGNIFICANT CHANGE UP
-  CEFAZOLIN: SIGNIFICANT CHANGE UP
-  CEFEPIME: SIGNIFICANT CHANGE UP
-  CEFEPIME: SIGNIFICANT CHANGE UP
-  CEFOXITIN: SIGNIFICANT CHANGE UP
-  CEFOXITIN: SIGNIFICANT CHANGE UP
-  CEFTRIAXONE: SIGNIFICANT CHANGE UP
-  CEFTRIAXONE: SIGNIFICANT CHANGE UP
-  CIPROFLOXACIN: SIGNIFICANT CHANGE UP
-  CIPROFLOXACIN: SIGNIFICANT CHANGE UP
-  ERTAPENEM: SIGNIFICANT CHANGE UP
-  ERTAPENEM: SIGNIFICANT CHANGE UP
-  GENTAMICIN: SIGNIFICANT CHANGE UP
-  GENTAMICIN: SIGNIFICANT CHANGE UP
-  IMIPENEM: SIGNIFICANT CHANGE UP
-  LEVOFLOXACIN: SIGNIFICANT CHANGE UP
-  LEVOFLOXACIN: SIGNIFICANT CHANGE UP
-  MEROPENEM: SIGNIFICANT CHANGE UP
-  MEROPENEM: SIGNIFICANT CHANGE UP
-  PIPERACILLIN/TAZOBACTAM: SIGNIFICANT CHANGE UP
-  PIPERACILLIN/TAZOBACTAM: SIGNIFICANT CHANGE UP
-  TOBRAMYCIN: SIGNIFICANT CHANGE UP
-  TOBRAMYCIN: SIGNIFICANT CHANGE UP
-  TRIMETHOPRIM/SULFAMETHOXAZOLE: SIGNIFICANT CHANGE UP
-  TRIMETHOPRIM/SULFAMETHOXAZOLE: SIGNIFICANT CHANGE UP
ANION GAP SERPL CALC-SCNC: 14 MMOL/L — SIGNIFICANT CHANGE UP (ref 5–17)
BASOPHILS # BLD AUTO: 0.04 K/UL — SIGNIFICANT CHANGE UP (ref 0–0.2)
BASOPHILS NFR BLD AUTO: 0.2 % — SIGNIFICANT CHANGE UP (ref 0–2)
BUN SERPL-MCNC: 19 MG/DL — SIGNIFICANT CHANGE UP (ref 8–20)
CALCIUM SERPL-MCNC: 8.7 MG/DL — SIGNIFICANT CHANGE UP (ref 8.6–10.2)
CHLORIDE SERPL-SCNC: 99 MMOL/L — SIGNIFICANT CHANGE UP (ref 98–107)
CO2 SERPL-SCNC: 22 MMOL/L — SIGNIFICANT CHANGE UP (ref 22–29)
CREAT SERPL-MCNC: 0.89 MG/DL — SIGNIFICANT CHANGE UP (ref 0.5–1.3)
EOSINOPHIL # BLD AUTO: 0.04 K/UL — SIGNIFICANT CHANGE UP (ref 0–0.5)
EOSINOPHIL NFR BLD AUTO: 0.2 % — SIGNIFICANT CHANGE UP (ref 0–6)
GLUCOSE SERPL-MCNC: 119 MG/DL — HIGH (ref 70–99)
HCT VFR BLD CALC: 37.2 % — SIGNIFICANT CHANGE UP (ref 34.5–45)
HGB BLD-MCNC: 12 G/DL — SIGNIFICANT CHANGE UP (ref 11.5–15.5)
IMM GRANULOCYTES NFR BLD AUTO: 0.6 % — SIGNIFICANT CHANGE UP (ref 0–1.5)
LYMPHOCYTES # BLD AUTO: 0.63 K/UL — LOW (ref 1–3.3)
LYMPHOCYTES # BLD AUTO: 3.8 % — LOW (ref 13–44)
MAGNESIUM SERPL-MCNC: 2.2 MG/DL — SIGNIFICANT CHANGE UP (ref 1.6–2.6)
MCHC RBC-ENTMCNC: 28.8 PG — SIGNIFICANT CHANGE UP (ref 27–34)
MCHC RBC-ENTMCNC: 32.3 GM/DL — SIGNIFICANT CHANGE UP (ref 32–36)
MCV RBC AUTO: 89.2 FL — SIGNIFICANT CHANGE UP (ref 80–100)
METHOD TYPE: SIGNIFICANT CHANGE UP
METHOD TYPE: SIGNIFICANT CHANGE UP
MONOCYTES # BLD AUTO: 0.49 K/UL — SIGNIFICANT CHANGE UP (ref 0–0.9)
MONOCYTES NFR BLD AUTO: 2.9 % — SIGNIFICANT CHANGE UP (ref 2–14)
NEUTROPHILS # BLD AUTO: 15.33 K/UL — HIGH (ref 1.8–7.4)
NEUTROPHILS NFR BLD AUTO: 92.3 % — HIGH (ref 43–77)
PHOSPHATE SERPL-MCNC: 2.8 MG/DL — SIGNIFICANT CHANGE UP (ref 2.4–4.7)
PLATELET # BLD AUTO: 209 K/UL — SIGNIFICANT CHANGE UP (ref 150–400)
POTASSIUM SERPL-MCNC: 4 MMOL/L — SIGNIFICANT CHANGE UP (ref 3.5–5.3)
POTASSIUM SERPL-SCNC: 4 MMOL/L — SIGNIFICANT CHANGE UP (ref 3.5–5.3)
RBC # BLD: 4.17 M/UL — SIGNIFICANT CHANGE UP (ref 3.8–5.2)
RBC # FLD: 13.3 % — SIGNIFICANT CHANGE UP (ref 10.3–14.5)
SODIUM SERPL-SCNC: 135 MMOL/L — SIGNIFICANT CHANGE UP (ref 135–145)
WBC # BLD: 16.63 K/UL — HIGH (ref 3.8–10.5)
WBC # FLD AUTO: 16.63 K/UL — HIGH (ref 3.8–10.5)

## 2020-04-05 PROCEDURE — 83735 ASSAY OF MAGNESIUM: CPT

## 2020-04-05 PROCEDURE — 83605 ASSAY OF LACTIC ACID: CPT

## 2020-04-05 PROCEDURE — 80053 COMPREHEN METABOLIC PANEL: CPT

## 2020-04-05 PROCEDURE — 86900 BLOOD TYPING SEROLOGIC ABO: CPT

## 2020-04-05 PROCEDURE — 99232 SBSQ HOSP IP/OBS MODERATE 35: CPT

## 2020-04-05 PROCEDURE — 86850 RBC ANTIBODY SCREEN: CPT

## 2020-04-05 PROCEDURE — 85610 PROTHROMBIN TIME: CPT

## 2020-04-05 PROCEDURE — 82947 ASSAY GLUCOSE BLOOD QUANT: CPT

## 2020-04-05 PROCEDURE — 85730 THROMBOPLASTIN TIME PARTIAL: CPT

## 2020-04-05 PROCEDURE — 82330 ASSAY OF CALCIUM: CPT

## 2020-04-05 PROCEDURE — 84295 ASSAY OF SERUM SODIUM: CPT

## 2020-04-05 PROCEDURE — 84100 ASSAY OF PHOSPHORUS: CPT

## 2020-04-05 PROCEDURE — 85027 COMPLETE CBC AUTOMATED: CPT

## 2020-04-05 PROCEDURE — 36415 COLL VENOUS BLD VENIPUNCTURE: CPT

## 2020-04-05 PROCEDURE — 87186 SC STD MICRODIL/AGAR DIL: CPT

## 2020-04-05 PROCEDURE — 84132 ASSAY OF SERUM POTASSIUM: CPT

## 2020-04-05 PROCEDURE — 83690 ASSAY OF LIPASE: CPT

## 2020-04-05 PROCEDURE — 80076 HEPATIC FUNCTION PANEL: CPT

## 2020-04-05 PROCEDURE — 82435 ASSAY OF BLOOD CHLORIDE: CPT

## 2020-04-05 PROCEDURE — 87205 SMEAR GRAM STAIN: CPT

## 2020-04-05 PROCEDURE — 76000 FLUOROSCOPY <1 HR PHYS/QHP: CPT

## 2020-04-05 PROCEDURE — 96374 THER/PROPH/DIAG INJ IV PUSH: CPT

## 2020-04-05 PROCEDURE — 86901 BLOOD TYPING SEROLOGIC RH(D): CPT

## 2020-04-05 PROCEDURE — 87070 CULTURE OTHR SPECIMN AEROBIC: CPT

## 2020-04-05 PROCEDURE — 82803 BLOOD GASES ANY COMBINATION: CPT

## 2020-04-05 PROCEDURE — 93005 ELECTROCARDIOGRAM TRACING: CPT

## 2020-04-05 PROCEDURE — 80048 BASIC METABOLIC PNL TOTAL CA: CPT

## 2020-04-05 PROCEDURE — C1729: CPT

## 2020-04-05 PROCEDURE — C1769: CPT

## 2020-04-05 PROCEDURE — 85014 HEMATOCRIT: CPT

## 2020-04-05 PROCEDURE — 71045 X-RAY EXAM CHEST 1 VIEW: CPT

## 2020-04-05 PROCEDURE — 99285 EMERGENCY DEPT VISIT HI MDM: CPT | Mod: 25

## 2020-04-05 PROCEDURE — 87075 CULTR BACTERIA EXCEPT BLOOD: CPT

## 2020-04-05 PROCEDURE — 76942 ECHO GUIDE FOR BIOPSY: CPT

## 2020-04-05 PROCEDURE — 87635 SARS-COV-2 COVID-19 AMP PRB: CPT

## 2020-04-05 RX ORDER — ACETAMINOPHEN 500 MG
2 TABLET ORAL
Qty: 30 | Refills: 0
Start: 2020-04-05

## 2020-04-05 RX ORDER — TRAMADOL HYDROCHLORIDE 50 MG/1
0.5 TABLET ORAL
Qty: 15 | Refills: 0
Start: 2020-04-05

## 2020-04-05 RX ORDER — ACETAMINOPHEN 500 MG
650 TABLET ORAL EVERY 6 HOURS
Refills: 0 | Status: DISCONTINUED | OUTPATIENT
Start: 2020-04-05 | End: 2020-04-05

## 2020-04-05 RX ORDER — ACETAMINOPHEN 500 MG
1000 TABLET ORAL ONCE
Refills: 0 | Status: COMPLETED | OUTPATIENT
Start: 2020-04-05 | End: 2020-04-05

## 2020-04-05 RX ADMIN — AMLODIPINE BESYLATE 5 MILLIGRAM(S): 2.5 TABLET ORAL at 05:20

## 2020-04-05 RX ADMIN — TRAMADOL HYDROCHLORIDE 25 MILLIGRAM(S): 50 TABLET ORAL at 04:47

## 2020-04-05 RX ADMIN — PIPERACILLIN AND TAZOBACTAM 25 GRAM(S): 4; .5 INJECTION, POWDER, LYOPHILIZED, FOR SOLUTION INTRAVENOUS at 10:03

## 2020-04-05 RX ADMIN — Medication 650 MILLIGRAM(S): at 11:51

## 2020-04-05 RX ADMIN — ENOXAPARIN SODIUM 40 MILLIGRAM(S): 100 INJECTION SUBCUTANEOUS at 11:51

## 2020-04-05 RX ADMIN — Medication 400 MILLIGRAM(S): at 10:02

## 2020-04-05 RX ADMIN — PANTOPRAZOLE SODIUM 40 MILLIGRAM(S): 20 TABLET, DELAYED RELEASE ORAL at 10:03

## 2020-04-05 NOTE — PROGRESS NOTE ADULT - ASSESSMENT
78yo female with acute cholecystitis now POD 1 following IR perc chase    Feeling well, pain improved.   No evidence of worsening infection    Continue home medications   Pain control  IS,OOB  Diet  Bowel Regimen  Replete lytes PRN/No longer requires daily labs  DVT ppx (Lov)   antibiotics per ID 78yo female with acute cholecystitis now POD 1 following IR perc chase    Feeling well, pain improved.   No evidence of worsening infection    Continue home medications   Pain control  IS,OOB  Diet  Bowel Regimen  Replete lytes PRN/No longer requires daily labs  DVT ppx (Lov)  Antibiotics per ID  PT

## 2020-04-05 NOTE — PROGRESS NOTE ADULT - PROBLEM SELECTOR PLAN 1
IV antibiotics  timing of surgery as per primary surgical team  LFT normal  NO further GI workup needed at this time

## 2020-04-05 NOTE — DISCHARGE NOTE PROVIDER - NSDCMRMEDTOKEN_GEN_ALL_CORE_FT
AMLODIPINE BESYLATE  5 MG TABS:   ATORVASTATIN CALCIUM  10 MG TABS:   DIAZEPAM  5 MG TABS:   HYDROCHLOROTHIAZIDE  12.5 MG TABS:   LOSARTAN POTASSIUM  100 MG TABS:

## 2020-04-05 NOTE — PROGRESS NOTE ADULT - ATTENDING COMMENTS
Patient seen and examined. Reports pain at the cholecystostomy tube site, remainder of her abdominal exam is benign. Drain with bilious output. WBC down to 19. Continue antibiotics. Diet initiated and will monitor. History of UC for which she uses lialda.
Patient seen and examined with surgery team. Above note reviewed and edited where appropriate.     Doing well after perc cholecystostomy. WBC downtrending  Plan for discharge home with PO abx. Strict return criteria provided to patient and family  She understands and agrees.

## 2020-04-05 NOTE — DISCHARGE NOTE PROVIDER - CARE PROVIDER_API CALL
Clay Hollis (MD)  Surgery  [address] [place] [address]  Phone: [phone]  Fax: [phone]  Follow Up Time: 2 weeks

## 2020-04-05 NOTE — PROGRESS NOTE ADULT - SUBJECTIVE AND OBJECTIVE BOX
Patient is a 79y old  Female who presents with a chief complaint of acute cholecystitits + r/o COVID (05 Apr 2020 01:18)      HPI:  Trauma and Acute Care Surgery Consult Note    HPI:   78yo Female w/ PMH COPD + HTN transferred from [facility] [place] on 04-02-20 for tx of acute cholecystitis.  Cholecystostomy tube in place. Patient has pain at the tube site . Worst with movement.  Better at rest. NO fever. LFT normal            PMH:  COPD  Multiple falls     PSH:  multiple prior orthopedic surgeries.  Salpingoophorectomy for ovarian cyst    Home Medications:  AMLODIPINE BESYLATE  5 MG TABS:  (03 Apr 2020 01:33)  ATORVASTATIN CALCIUM  10 MG TABS:  (03 Apr 2020 01:33)  DIAZEPAM  5 MG TABS:  (03 Apr 2020 01:33)  HYDROCHLOROTHIAZIDE  12.5 MG TABS:  (03 Apr 2020 01:33)  LOSARTAN POTASSIUM  100 MG TABS:  (03 Apr 2020 01:33)      ALL:  NKDA    FMH:  Denies family history of gastrointestinal malignancy     SOC Hx:   Denies etoh, drug use. Former smoker        REVIEW OF SYSTEMS:  Constitutional: No fever, weight loss or fatigue  ENMT:  No difficulty hearing, tinnitus, vertigo; No sinus or throat pain  Respiratory: No cough, wheezing, chills or hemoptysis  Cardiovascular: No chest pain, palpitations, dizziness or leg swelling  Gastrointestinal: + abdominal  pain. No nausea, vomiting or hematemesis; No diarrhea or constipation. No melena or hematochezia.  Skin: No itching, burning, rashes or lesions   Musculoskeletal: No joint pain or swelling; No muscle, back or extremity pain    PAST MEDICAL & SURGICAL HISTORY:      FAMILY HISTORY:      SOCIAL HISTORY:  Smoking Status: [ ] Current, [ ] Former, [ ] Never  Pack Years:  [  ] EtOH-no  [  ] IVDA    MEDICATIONS:  MEDICATIONS  (STANDING):  acetaminophen   Tablet .. 650 milliGRAM(s) Oral every 6 hours  acetaminophen  IVPB .. 1000 milliGRAM(s) IV Intermittent once  albuterol/ipratropium for Nebulization 3 milliLiter(s) Nebulizer every 6 hours  amLODIPine   Tablet 5 milliGRAM(s) Oral daily  enoxaparin Injectable 40 milliGRAM(s) SubCutaneous daily  pantoprazole    Tablet 40 milliGRAM(s) Oral before breakfast  piperacillin/tazobactam IVPB.. 3.375 Gram(s) IV Intermittent every 8 hours    MEDICATIONS  (PRN):  ondansetron Injectable 4 milliGRAM(s) IV Push every 6 hours PRN Nausea  traMADol 25 milliGRAM(s) Oral every 4 hours PRN Moderate Pain (4 - 6)      Allergies    No Known Allergies    Intolerances        Vital Signs Last 24 Hrs  T(C): 36.6 (05 Apr 2020 08:20), Max: 36.7 (04 Apr 2020 19:10)  T(F): 97.9 (05 Apr 2020 08:20), Max: 98.1 (04 Apr 2020 19:10)  HR: 87 (05 Apr 2020 08:20) (72 - 87)  BP: 131/65 (05 Apr 2020 08:20) (117/65 - 131/65)  BP(mean): --  RR: 20 (05 Apr 2020 08:20) (20 - 20)  SpO2: 95% (05 Apr 2020 08:20) (95% - 98%)    04-04 @ 07:01  -  04-05 @ 07:00  --------------------------------------------------------  IN: 0 mL / OUT: 150 mL / NET: -150 mL          PHYSICAL EXAM:    General: Well developed; well nourished; in no acute distress  HEENT: MMM, conjunctiva and sclera clear  H- RRR  L- CTA  Gastrointestinal: Soft, non-tender non-distended; Normal bowel sounds; No rebound or guarding- Cholecystostomy tube in place in right upper quadrant  Extremities: Normal range of motion, No clubbing, cyanosis or edema  Neurological: Alert and oriented x3  Skin: Warm and dry. No obvious rash      LABS:                        11.8   19.47 )-----------( 182      ( 04 Apr 2020 08:14 )             35.6     04 Apr 2020 08:14    137    |  102    |  26.0   ----------------------------<  87     3.4     |  18.0   |  1.12     Ca    8.4        04 Apr 2020 08:14  Phos  2.4       04 Apr 2020 08:14  Mg     2.5       04 Apr 2020 08:14    TPro  5.8    /  Alb  2.6    /  TBili  0.5    /  DBili  0.2    /  AST  24     /  ALT  30     /  AlkPhos  95     / Amylase x      /Lipase x      04 Apr 2020 08:14              RADIOLOGY & ADDITIONAL STUDIES:

## 2020-04-05 NOTE — PROGRESS NOTE ADULT - REASON FOR ADMISSION
acute cholecystitits + r/o COVID

## 2020-04-05 NOTE — DISCHARGE NOTE PROVIDER - NSDCCPCAREPLAN_GEN_ALL_CORE_FT
PRINCIPAL DISCHARGE DIAGNOSIS  Diagnosis: Acute cholecystitis  Assessment and Plan of Treatment: Follow up: Please call and make an appointment with Dr. Hollis 10-14 days after discharge. Also, please call and make an appointment with your primary care physician as per your usual schedule.   Activity: May return to normal activities as tolerated, however refrain from heavy lifting > 10-15 lbs.  Diet: May continue regular diet.  Medications: Please take all medications listed on your discharge paperwork as prescribed. Pain medication has been prescribed for you. Please, take it as it has been prescribed, do not drive or operate heavy machinery while taking narcotics.  You are encouraged to take over-the-counter tylenol and/or ibuprofen for pain relief when you feel your pain no longer warrants the use of narcotic pain medications, however DO NOT TAKE percocet and tylenol at the same time as they contain the same active ingredient (acetaminophen). Take only percocet OR tylenol.  Wound Care: Please, keep wound site clean and dry. Drain teaching required, VND set up to drain and measure the outputs.   Patient is advised to RETURN TO THE EMERGENCY DEPARTMENT for any of the following - worsening pain, fever/chills, nausea/vomiting, altered mental status, chest pain, shortness of breath, or any other new / worsening symptom.

## 2020-04-05 NOTE — PROGRESS NOTE ADULT - SUBJECTIVE AND OBJECTIVE BOX
INTERVAL HPI/OVERNIGHT EVENTS:    Patient seen this AM with no acute events overnight. Patient without any acute complaints at this time. Patients' pain is well controlled on current pain regiment. Tolerating diet without any n/v, has been having normal bowel function. Remains hemodynamically stable and denies fevers, chills. No CP or SOB     MEDICATIONS  (STANDING):  albuterol/ipratropium for Nebulization 3 milliLiter(s) Nebulizer every 6 hours  amLODIPine   Tablet 5 milliGRAM(s) Oral daily  enoxaparin Injectable 40 milliGRAM(s) SubCutaneous daily  pantoprazole    Tablet 40 milliGRAM(s) Oral before breakfast  piperacillin/tazobactam IVPB.. 3.375 Gram(s) IV Intermittent every 8 hours    MEDICATIONS  (PRN):  acetaminophen   Tablet .. 650 milliGRAM(s) Oral every 6 hours PRN Temp greater or equal to 38C (100.4F), Mild Pain (1 - 3)  ondansetron Injectable 4 milliGRAM(s) IV Push every 6 hours PRN Nausea  traMADol 25 milliGRAM(s) Oral every 4 hours PRN Moderate Pain (4 - 6)      Vital Signs Last 24 Hrs  T(C): 36.7 (04 Apr 2020 19:10), Max: 36.7 (04 Apr 2020 04:27)  T(F): 98.1 (04 Apr 2020 19:10), Max: 98.1 (04 Apr 2020 04:27)  HR: 74 (04 Apr 2020 19:10) (74 - 86)  BP: 119/73 (04 Apr 2020 19:10) (108/68 - 119/76)  BP(mean): 91 (04 Apr 2020 04:27) (91 - 91)  RR: 20 (04 Apr 2020 19:10) (20 - 20)  SpO2: 96% (04 Apr 2020 19:10) (95% - 97%)    Physical Exam:  GEN: NAD, laying in bed, appears stated age  HEENT: NCAT, clear oral mucosa, normal conjunctiva  Chest: non-tender  CV:  non-tachycardic, 2+ radial pulse  Pulm: no increased work of breathing, no conversational dyspnea  GI: soft, non tender, perc chase in place with bilious drainage, dressing clean  MSK: moving all extremities       I&O's Detail      LABS:                        11.8   19.47 )-----------( 182      ( 04 Apr 2020 08:14 )             35.6     04-04    137  |  102  |  26.0<H>  ----------------------------<  87  3.4<L>   |  18.0<L>  |  1.12    Ca    8.4<L>      04 Apr 2020 08:14  Phos  2.4     04-04  Mg     2.5     04-04    TPro  5.8<L>  /  Alb  2.6<L>  /  TBili  0.5  /  DBili  0.2  /  AST  24  /  ALT  30  /  AlkPhos  95  04-04    PT/INR - ( 04 Apr 2020 08:14 )   PT: 12.1 sec;   INR: 1.07 ratio         PTT - ( 04 Apr 2020 08:14 )  PTT:25.3 sec      RADIOLOGY & ADDITIONAL STUDIES: INTERVAL HPI/OVERNIGHT EVENTS:    Patient seen this AM with no acute events overnight. Patient without any acute complaints at this time. Patients' pain is well controlled on current pain regiment. Tolerating diet without any n/v, has been having normal bowel function. Remains hemodynamically stable and denies fevers, chills. No CP or SOB     MEDICATIONS  (STANDING):  albuterol/ipratropium for Nebulization 3 milliLiter(s) Nebulizer every 6 hours  amLODIPine   Tablet 5 milliGRAM(s) Oral daily  enoxaparin Injectable 40 milliGRAM(s) SubCutaneous daily  pantoprazole    Tablet 40 milliGRAM(s) Oral before breakfast  piperacillin/tazobactam IVPB.. 3.375 Gram(s) IV Intermittent every 8 hours    MEDICATIONS  (PRN):  acetaminophen   Tablet .. 650 milliGRAM(s) Oral every 6 hours PRN Temp greater or equal to 38C (100.4F), Mild Pain (1 - 3)  ondansetron Injectable 4 milliGRAM(s) IV Push every 6 hours PRN Nausea  traMADol 25 milliGRAM(s) Oral every 4 hours PRN Moderate Pain (4 - 6)      Vital Signs Last 24 Hrs  T(C): 36.7 (04 Apr 2020 19:10), Max: 36.7 (04 Apr 2020 04:27)  T(F): 98.1 (04 Apr 2020 19:10), Max: 98.1 (04 Apr 2020 04:27)  HR: 74 (04 Apr 2020 19:10) (74 - 86)  BP: 119/73 (04 Apr 2020 19:10) (108/68 - 119/76)  BP(mean): 91 (04 Apr 2020 04:27) (91 - 91)  RR: 20 (04 Apr 2020 19:10) (20 - 20)  SpO2: 96% (04 Apr 2020 19:10) (95% - 97%)    Physical Exam:  GEN: NAD, laying in bed, appears stated age  HEENT: NCAT, clear oral mucosa, normal conjunctiva  Chest: non-tender  CV:  non-tachycardic, 2+ radial pulse  Pulm: no increased work of breathing, no conversational dyspnea  GI: soft, non tender, perc chase in place with bilious drainage, dressing clean  MSK: moving all extremities       I&O's Detail      LABS:                        11.8   19.47 )-----------( 182      ( 04 Apr 2020 08:14 )             35.6     04-04    137  |  102  |  26.0<H>  ----------------------------<  87  3.4<L>   |  18.0<L>  |  1.12    Ca    8.4<L>      04 Apr 2020 08:14  Phos  2.4     04-04  Mg     2.5     04-04    TPro  5.8<L>  /  Alb  2.6<L>  /  TBili  0.5  /  DBili  0.2  /  AST  24  /  ALT  30  /  AlkPhos  95  04-04    PT/INR - ( 04 Apr 2020 08:14 )   PT: 12.1 sec;   INR: 1.07 ratio         PTT - ( 04 Apr 2020 08:14 )  PTT:25.3 sec

## 2020-04-05 NOTE — DISCHARGE NOTE PROVIDER - HOSPITAL COURSE
Admission 4/3: 78yo Female w/ PMH COPD + HTN transferred from [facility] on 04-02-20 for tx of acute cholecystitis. Patient initially referred to [facility] by pulmonologist due to worsening SOB and concern for COVID. There she also revealed she had had RUQ pain for a day, subsequent RUQ ultrasound revealed impacted stone at GB neck and pericholecystic fluid. Due to medical comorbitities patient transferred to [facility] as likely needed cholecystostomy tube and [facility] lacks IR capabilities.          PMH:    COPD    Multiple falls         PSH:    multiple prior orthopedic surgeries.    Salpingoophorectomy for ovarian cyst Patient is a 78yo Female w/ PMH COPD + HTN transferred from [facility] on 04-02-20 for tx of acute cholecystitis. Patient initially referred to [facility] by pulmonologist due to worsening SOB and concern for COVID. There sh also revealed she had RUQ pain for a day, subsequent RUQ ultrasound revealed impacted stone at GB neck and pericholecystic fluid WBC significant for leukocytosis of 28. Due to medical comorbitities patient transferred to [facility] as likely needed cholecystostomy tube and [facility] lacks IR capabilities. Due to medical comorbidities, treated w/ cholecystostomy tube on 4/3 without complications, will required VNS for teaching and measuring output. Leukocytosis downtrending each day, pain is well controlled on PO medication. Patient tolerating diet, feeling well and stable for discharge today.

## 2020-04-05 NOTE — DISCHARGE NOTE NURSING/CASE MANAGEMENT/SOCIAL WORK - PATIENT PORTAL LINK FT
You can access the FollowMyHealth Patient Portal offered by [facility] by registering at the following website: [email or web address]. By joining [employer]’s FollowMyHealth portal, you will also be able to view your health information using other applications (apps) compatible with our system.

## 2020-04-06 LAB
-  AMPICILLIN: SIGNIFICANT CHANGE UP
-  TETRACYCLINE: SIGNIFICANT CHANGE UP
-  VANCOMYCIN: SIGNIFICANT CHANGE UP
METHOD TYPE: SIGNIFICANT CHANGE UP

## 2020-04-08 LAB
CULTURE RESULTS: SIGNIFICANT CHANGE UP
ORGANISM # SPEC MICROSCOPIC CNT: SIGNIFICANT CHANGE UP
SPECIMEN SOURCE: SIGNIFICANT CHANGE UP

## 2020-04-10 ENCOUNTER — EMERGENCY (EMERGENCY)
Facility: HOSPITAL | Age: 80
LOS: 1 days | End: 2020-04-10
Admitting: EMERGENCY MEDICINE
Payer: MEDICARE

## 2020-04-10 PROCEDURE — 74177 CT ABD & PELVIS W/CONTRAST: CPT | Mod: 26

## 2020-04-10 PROCEDURE — 99284 EMERGENCY DEPT VISIT MOD MDM: CPT

## 2020-06-26 ENCOUNTER — APPOINTMENT (OUTPATIENT)
Dept: CARDIOLOGY | Facility: CLINIC | Age: 80
End: 2020-06-26

## 2020-08-20 NOTE — DISCHARGE NOTE PROVIDER - PROVIDER TOKENS
----- Message from Andres Pond MD sent at 8/20/2020  9:18 AM CDT -----  Left VM for pt; advised to call back for negative results (normal subcutaneous tissue) which is extremely reassuring and expected after a negative physical exam. OK to f/u as planned.   PROVIDER:[TOKEN:[[id number]],FOLLOWUP:[2 weeks]]

## 2020-10-25 ENCOUNTER — RX RENEWAL (OUTPATIENT)
Age: 80
End: 2020-10-25

## 2020-10-25 RX ORDER — AMLODIPINE BESYLATE 5 MG/1
5 TABLET ORAL
Qty: 90 | Refills: 3 | Status: ACTIVE | COMMUNITY
Start: 2019-02-11 | End: 1900-01-01

## 2021-02-17 ENCOUNTER — EMERGENCY (EMERGENCY)
Facility: HOSPITAL | Age: 81
LOS: 1 days | End: 2021-02-17
Admitting: EMERGENCY MEDICINE
Payer: MEDICARE

## 2021-02-17 PROCEDURE — 93010 ELECTROCARDIOGRAM REPORT: CPT

## 2021-02-17 PROCEDURE — 99285 EMERGENCY DEPT VISIT HI MDM: CPT

## 2021-02-17 PROCEDURE — 71045 X-RAY EXAM CHEST 1 VIEW: CPT | Mod: 26

## 2021-02-17 PROCEDURE — 76705 ECHO EXAM OF ABDOMEN: CPT | Mod: 26

## 2021-11-03 ENCOUNTER — OUTPATIENT (OUTPATIENT)
Dept: OUTPATIENT SERVICES | Facility: HOSPITAL | Age: 81
LOS: 1 days | End: 2021-11-03

## 2021-11-06 ENCOUNTER — APPOINTMENT (OUTPATIENT)
Dept: ULTRASOUND IMAGING | Facility: CLINIC | Age: 81
End: 2021-11-06
Payer: MEDICARE

## 2021-11-06 PROCEDURE — 76700 US EXAM ABDOM COMPLETE: CPT

## 2022-06-13 ENCOUNTER — NON-APPOINTMENT (OUTPATIENT)
Age: 82
End: 2022-06-13

## 2023-01-17 ENCOUNTER — APPOINTMENT (OUTPATIENT)
Dept: ENDOCRINOLOGY | Facility: CLINIC | Age: 83
End: 2023-01-17
Payer: MEDICARE

## 2023-01-17 VITALS
HEIGHT: 67 IN | TEMPERATURE: 97.1 F | DIASTOLIC BLOOD PRESSURE: 80 MMHG | OXYGEN SATURATION: 97 % | BODY MASS INDEX: 23.54 KG/M2 | SYSTOLIC BLOOD PRESSURE: 140 MMHG | HEART RATE: 86 BPM | WEIGHT: 150 LBS

## 2023-01-17 DIAGNOSIS — M81.8 OTHER OSTEOPOROSIS W/OUT CURRENT PATHOLOGICAL FRACTURE: ICD-10-CM

## 2023-01-17 PROCEDURE — 99205 OFFICE O/P NEW HI 60 MIN: CPT

## 2023-01-17 RX ORDER — HYDROCHLOROTHIAZIDE 12.5 MG/1
12.5 TABLET ORAL DAILY
Qty: 30 | Refills: 0 | Status: DISCONTINUED | COMMUNITY
Start: 2020-02-24 | End: 2023-01-17

## 2023-01-17 NOTE — PHYSICAL EXAM
[Alert] : alert [Normal Sclera/Conjunctiva] : normal sclera/conjunctiva [EOMI] : extra ocular movement intact [PERRL] : pupils equal, round and reactive to light [Normal Outer Ear/Nose] : the ears and nose were normal in appearance [No Neck Mass] : no neck mass was observed [Thyroid Not Enlarged] : the thyroid was not enlarged [No Respiratory Distress] : no respiratory distress [Clear to Auscultation] : lungs were clear to auscultation bilaterally [Normal S1, S2] : normal S1 and S2 [No Murmurs] : no murmurs [Normal Rate] : heart rate was normal [Regular Rhythm] : with a regular rhythm [Carotids Normal] : carotid pulses were normal with no bruits [Not Tender] : non-tender [Soft] : abdomen soft [No HSM] : no hepato-splenomegaly [Normal Supraclavicular Nodes] : no supraclavicular lymphadenopathy [Normal Anterior Cervical Nodes] : no anterior cervical lymphadenopathy [No CVA Tenderness] : no ~M costovertebral angle tenderness [No Clubbing, Cyanosis] : no clubbing  or cyanosis of the fingernails [No Joint Swelling] : no joint swelling seen [No Skin Lesions] : no skin lesions [No Motor Deficits] : the motor exam was normal [Normal Reflexes] : deep tendon reflexes were 2+ and symmetric [No Tremors] : no tremors [Oriented x3] : oriented to person, place, and time [de-identified] : Thin white female [de-identified] : Decreased distal pulses [de-identified] : Deferred [FreeTextEntry1] : Deferred [de-identified] : Deferred [de-identified] : Positive for tenderness over the lower back [de-identified] : No acute endocrine disorder [de-identified] : Unstable gait

## 2023-01-17 NOTE — HISTORY OF PRESENT ILLNESS
[FreeTextEntry1] : pt is coming in to consult on bone density.  82-year-old white female who presents for evaluation of her osteoporosis.  Patient is accompanied by her daughter.  Patient was last seen in our office in 2019 at that time she was offered treatment with Prolia but apparently she was advised to touch to take some vitamin supplements which she had been taking till recently.  She has not had any recent bone density test.  She does report shortness of breath and chronic low back pain.  She denies any difficulty swallowing neck pain hoarseness shortness of breath chest pain.  Her past medical history significant for multiple fractures involving the hip and the pelvic area social history patient is an ex-smoker quit 20 years ago.  Her past medical history significant for hypertension coronary artery disease COPD hyperlipidemia hypertension aortic stenosis hypertension and peripheral arterial disease patient denies any recent fall or fractures however she does report having multiple broken teeth for which she requires extensive dental work.

## 2023-01-17 NOTE — REVIEW OF SYSTEMS
[Joint Pain] : joint pain [Muscle Weakness] : muscle weakness [Joint Stiffness] : joint stiffness [Negative] : Endocrine [FreeTextEntry2] : Elderly white female with unstable gait and scoliotic posture. [FreeTextEntry4] : Multiple missing teeth with broken fragments

## 2023-01-17 NOTE — ASSESSMENT
[FreeTextEntry1] : Elderly white female with a past medical history of hypertension heart disease COPD and also has a history of osteoporosis for which she was treated with vitamin supplements for the past 2 to 3 years.  In the year 2019 she has had a bone density test which apparently was consistent with osteoporosis but at that time the patient had declined treatment with Prolia.  Patient does have a history of multiple fractures and chronic low back pain.  Recommendation\par 1.  I have advised the patient to obtain a blood test to check a TSH level intact PTH basic metabolic panel and N-TELOPEPTIDE\par #2  I am also referring the patient for a follow-up bone density stud /to compare it with her previous test\par 3.  I have also suggested to the patient that she should have a complete dental evaluation so that we can assess the risk for possible complications from Prolia treatment such as osteonecrosis of the jaw.  She will be contacting her dentist for this reason.\par Thank you for the kind courtesy of this consultation I will be in contact with the patient once I receive the results of the above tests and will decide accordingly thank you

## 2024-01-29 ENCOUNTER — NON-APPOINTMENT (OUTPATIENT)
Age: 84
End: 2024-01-29

## 2024-07-08 ENCOUNTER — NON-APPOINTMENT (OUTPATIENT)
Age: 84
End: 2024-07-08

## 2024-07-15 ENCOUNTER — NON-APPOINTMENT (OUTPATIENT)
Age: 84
End: 2024-07-15

## 2024-07-17 ENCOUNTER — NON-APPOINTMENT (OUTPATIENT)
Age: 84
End: 2024-07-17

## 2024-07-18 ENCOUNTER — APPOINTMENT (OUTPATIENT)
Dept: ORTHOPEDIC SURGERY | Facility: CLINIC | Age: 84
End: 2024-07-18
Payer: MEDICARE

## 2024-07-18 VITALS
WEIGHT: 205 LBS | SYSTOLIC BLOOD PRESSURE: 108 MMHG | HEART RATE: 82 BPM | BODY MASS INDEX: 32.95 KG/M2 | DIASTOLIC BLOOD PRESSURE: 69 MMHG | HEIGHT: 66 IN

## 2024-07-18 DIAGNOSIS — S63.659A SPRAIN OF METACARPOPHALANGEAL JOINT OF UNSPECIFIED FINGER, INITIAL ENCOUNTER: ICD-10-CM

## 2024-07-18 PROCEDURE — 99203 OFFICE O/P NEW LOW 30 MIN: CPT

## 2024-07-30 ENCOUNTER — TRANSCRIPTION ENCOUNTER (OUTPATIENT)
Age: 84
End: 2024-07-30

## 2024-08-02 ENCOUNTER — TRANSCRIPTION ENCOUNTER (OUTPATIENT)
Age: 84
End: 2024-08-02